# Patient Record
Sex: FEMALE | Race: OTHER | Employment: OTHER | URBAN - METROPOLITAN AREA
[De-identification: names, ages, dates, MRNs, and addresses within clinical notes are randomized per-mention and may not be internally consistent; named-entity substitution may affect disease eponyms.]

---

## 2017-01-07 RX ORDER — METOPROLOL SUCCINATE 50 MG/1
TABLET, EXTENDED RELEASE ORAL
Qty: 90 TABLET | Refills: 0 | Status: SHIPPED | OUTPATIENT
Start: 2017-01-07 | End: 2017-04-05

## 2017-04-05 RX ORDER — HYDROCHLOROTHIAZIDE 12.5 MG/1
CAPSULE, GELATIN COATED ORAL
Qty: 90 CAPSULE | Refills: 0 | Status: SHIPPED | OUTPATIENT
Start: 2017-04-05 | End: 2017-04-19

## 2017-04-05 RX ORDER — METOPROLOL SUCCINATE 50 MG/1
TABLET, EXTENDED RELEASE ORAL
Qty: 90 TABLET | Refills: 0 | Status: SHIPPED | OUTPATIENT
Start: 2017-04-05 | End: 2017-04-19

## 2017-04-19 ENCOUNTER — OFFICE VISIT (OUTPATIENT)
Dept: INTERNAL MEDICINE CLINIC | Facility: CLINIC | Age: 77
End: 2017-04-19

## 2017-04-19 VITALS
HEART RATE: 54 BPM | WEIGHT: 113 LBS | HEIGHT: 61 IN | DIASTOLIC BLOOD PRESSURE: 76 MMHG | BODY MASS INDEX: 21.34 KG/M2 | SYSTOLIC BLOOD PRESSURE: 134 MMHG

## 2017-04-19 DIAGNOSIS — M06.9 RHEUMATOID ARTHRITIS INVOLVING BOTH HANDS, UNSPECIFIED RHEUMATOID FACTOR PRESENCE: ICD-10-CM

## 2017-04-19 DIAGNOSIS — I10 HYPERTENSION, BENIGN: Primary | ICD-10-CM

## 2017-04-19 PROCEDURE — 99213 OFFICE O/P EST LOW 20 MIN: CPT | Performed by: INTERNAL MEDICINE

## 2017-04-19 PROCEDURE — 36415 COLL VENOUS BLD VENIPUNCTURE: CPT | Performed by: INTERNAL MEDICINE

## 2017-04-19 RX ORDER — HYDROCHLOROTHIAZIDE 12.5 MG/1
12.5 CAPSULE, GELATIN COATED ORAL
Qty: 90 CAPSULE | Refills: 1 | Status: SHIPPED | OUTPATIENT
Start: 2017-04-19 | End: 2018-01-01

## 2017-04-19 RX ORDER — METOPROLOL SUCCINATE 50 MG/1
TABLET, EXTENDED RELEASE ORAL
Qty: 90 TABLET | Refills: 1 | Status: SHIPPED | OUTPATIENT
Start: 2017-04-19 | End: 2018-01-01

## 2017-04-19 NOTE — PATIENT INSTRUCTIONS
ASSESSMENT/PLAN:   Diagnoses and all orders for this visit:    Hypertension, benign  BP overall doing well with current meds  Rheumatoid arthritis involving both hands, unspecified rheumatoid factor presence ([facility] [address]  Patient overall doing well, to continue c

## 2017-04-19 NOTE — PROGRESS NOTES
HPI:    Patient ID: Mis Shaw is a 78year old female. HPI  Hypertension  Patient is here for follow up of hypertension. BP at home: not checking, machine broke  Has been compliant with medications.   Exercise level: somewhat active and has been foll Constitutional: She appears well-developed and well-nourished. HENT:   Head: Normocephalic and atraumatic. Cardiovascular: Normal rate and regular rhythm. Edema not present.   Pulmonary/Chest: Effort normal and breath sounds normal.   Musculoskele

## 2017-11-20 ENCOUNTER — OFFICE VISIT (OUTPATIENT)
Dept: INTERNAL MEDICINE CLINIC | Facility: CLINIC | Age: 77
End: 2017-11-20

## 2017-11-20 VITALS
SYSTOLIC BLOOD PRESSURE: 136 MMHG | DIASTOLIC BLOOD PRESSURE: 74 MMHG | BODY MASS INDEX: 22.19 KG/M2 | HEART RATE: 64 BPM | HEIGHT: 60 IN | WEIGHT: 113 LBS

## 2017-11-20 DIAGNOSIS — M85.80 SENILE OSTEOPENIA: ICD-10-CM

## 2017-11-20 DIAGNOSIS — Z78.0 MENOPAUSE: ICD-10-CM

## 2017-11-20 DIAGNOSIS — I10 HYPERTENSION, BENIGN: Primary | ICD-10-CM

## 2017-11-20 DIAGNOSIS — M06.09 RHEUMATOID ARTHRITIS OF MULTIPLE SITES WITH NEGATIVE RHEUMATOID FACTOR (HCC): ICD-10-CM

## 2017-11-20 PROCEDURE — 36415 COLL VENOUS BLD VENIPUNCTURE: CPT | Performed by: INTERNAL MEDICINE

## 2017-11-20 PROCEDURE — G0008 ADMIN INFLUENZA VIRUS VAC: HCPCS | Performed by: INTERNAL MEDICINE

## 2017-11-20 PROCEDURE — 90653 IIV ADJUVANT VACCINE IM: CPT | Performed by: INTERNAL MEDICINE

## 2017-11-20 PROCEDURE — 99213 OFFICE O/P EST LOW 20 MIN: CPT | Performed by: INTERNAL MEDICINE

## 2017-11-20 NOTE — PATIENT INSTRUCTIONS
ASSESSMENT/PLAN:   Diagnoses and all orders for this visit:    Hypertension, benign  BP overall doing very well, continue current meds and regular exercise.     Rheumatoid arthritis of multiple sites with negative rheumatoid factor ([facility] [address]  Patient overall st

## 2017-11-20 NOTE — PROGRESS NOTES
HPI:    Patient ID: Branden Holm is a 78year old female. HPI  Hypertension  Patient is here for follow up of hypertension. BP at home: not checking  Has been compliant with medications.   Exercise level: somewhat active and has been following low salt well-nourished. HENT:   Head: Normocephalic and atraumatic. Cardiovascular: Normal rate and regular rhythm. Edema not present. Carotid bruit not present.   Pulmonary/Chest: Effort normal and breath sounds normal.   Musculoskeletal:   Severe RA change

## 2018-01-01 RX ORDER — METOPROLOL SUCCINATE 50 MG/1
TABLET, EXTENDED RELEASE ORAL
Qty: 90 TABLET | Refills: 1 | Status: SHIPPED | OUTPATIENT
Start: 2018-01-01 | End: 2018-07-06

## 2018-01-01 RX ORDER — HYDROCHLOROTHIAZIDE 12.5 MG/1
CAPSULE, GELATIN COATED ORAL
Qty: 90 CAPSULE | Refills: 1 | Status: SHIPPED | OUTPATIENT
Start: 2018-01-01 | End: 2018-07-22

## 2018-04-23 ENCOUNTER — OFFICE VISIT (OUTPATIENT)
Dept: INTERNAL MEDICINE CLINIC | Facility: CLINIC | Age: 78
End: 2018-04-23

## 2018-04-23 VITALS
WEIGHT: 113 LBS | BODY MASS INDEX: 22.19 KG/M2 | DIASTOLIC BLOOD PRESSURE: 69 MMHG | SYSTOLIC BLOOD PRESSURE: 126 MMHG | HEART RATE: 64 BPM | HEIGHT: 60 IN

## 2018-04-23 DIAGNOSIS — M54.50 LUMBAR PAIN: Primary | ICD-10-CM

## 2018-04-23 PROCEDURE — 99213 OFFICE O/P EST LOW 20 MIN: CPT | Performed by: INTERNAL MEDICINE

## 2018-04-23 PROCEDURE — G0463 HOSPITAL OUTPT CLINIC VISIT: HCPCS | Performed by: INTERNAL MEDICINE

## 2018-04-23 RX ORDER — TIZANIDINE 2 MG/1
2 TABLET ORAL EVERY 6 HOURS PRN
Qty: 30 TABLET | Refills: 0 | Status: SHIPPED | OUTPATIENT
Start: 2018-04-23 | End: 2018-05-21

## 2018-04-23 RX ORDER — PREDNISONE 20 MG/1
20 TABLET ORAL DAILY
Qty: 4 TABLET | Refills: 0 | Status: SHIPPED | OUTPATIENT
Start: 2018-04-23 | End: 2018-04-27

## 2018-04-23 NOTE — PATIENT INSTRUCTIONS
ASSESSMENT/PLAN:   Diagnoses and all orders for this visit:    Lumbar pain    patient with moderate pain which seems muscular with no radiating pain. Will give her short course of prednisone and should have x-ray of her lower back.  Will also give her muscl

## 2018-04-23 NOTE — PROGRESS NOTES
HPI:    Patient ID: Loy Gutierrez is a 78year old female. HPI  Back pain  Patient with lower back pain for the last 2 weeks. Doesn't recall any injury or particular event that caused it. Has tried heat which didn't really help.  Doesn't radiate to her l

## 2018-05-21 ENCOUNTER — OFFICE VISIT (OUTPATIENT)
Dept: INTERNAL MEDICINE CLINIC | Facility: CLINIC | Age: 78
End: 2018-05-21

## 2018-05-21 VITALS
BODY MASS INDEX: 21.52 KG/M2 | SYSTOLIC BLOOD PRESSURE: 132 MMHG | HEART RATE: 67 BPM | TEMPERATURE: 98 F | HEIGHT: 61 IN | WEIGHT: 114 LBS | DIASTOLIC BLOOD PRESSURE: 66 MMHG | RESPIRATION RATE: 18 BRPM

## 2018-05-21 DIAGNOSIS — M06.09 RHEUMATOID ARTHRITIS OF MULTIPLE SITES WITH NEGATIVE RHEUMATOID FACTOR (HCC): ICD-10-CM

## 2018-05-21 DIAGNOSIS — I10 HYPERTENSION, BENIGN: Primary | ICD-10-CM

## 2018-05-21 PROCEDURE — G0463 HOSPITAL OUTPT CLINIC VISIT: HCPCS | Performed by: INTERNAL MEDICINE

## 2018-05-21 PROCEDURE — 36415 COLL VENOUS BLD VENIPUNCTURE: CPT | Performed by: INTERNAL MEDICINE

## 2018-05-21 PROCEDURE — 99213 OFFICE O/P EST LOW 20 MIN: CPT | Performed by: INTERNAL MEDICINE

## 2018-05-21 NOTE — ASSESSMENT & PLAN NOTE
BP well controlled. Encouraged her to exercise regularly.  Should check BP  At times at home as it sometimes can run high

## 2018-05-21 NOTE — PROGRESS NOTES
HPI:    Patient ID: Kylah De La O is a 78year old female. HPI  Hypertension  Patient is here for follow up of hypertension. BP at home: not checking, her machine broke. Has been compliant with medications.   Exercise level: somewhat active and has been Vitals reviewed. Constitutional: She appears well-developed and well-nourished. Cardiovascular: Normal rate and regular rhythm. Edema not present.   Pulmonary/Chest: Effort normal and breath sounds normal.   Musculoskeletal:   Moderate RA changes

## 2018-05-21 NOTE — PATIENT INSTRUCTIONS
ASSESSMENT/PLAN:   Hypertension, benign  BP well controlled. Encouraged her to exercise regularly.  Should check BP  At times at home as it sometimes can run high    Rheumatoid arthritis of multiple sites with negative rheumatoid factor (HCC)  Overall stabl

## 2018-05-22 NOTE — PROGRESS NOTES
Labs normal except her potassium is low, needs to take potassium pill daily. Let her know  I sent her labs to Dr Bridger Ding.

## 2018-07-06 RX ORDER — METOPROLOL SUCCINATE 50 MG/1
TABLET, EXTENDED RELEASE ORAL
Qty: 90 TABLET | Refills: 0 | Status: SHIPPED | OUTPATIENT
Start: 2018-07-06 | End: 2018-10-04

## 2018-07-22 RX ORDER — HYDROCHLOROTHIAZIDE 12.5 MG/1
CAPSULE, GELATIN COATED ORAL
Qty: 90 CAPSULE | Refills: 0 | Status: SHIPPED | OUTPATIENT
Start: 2018-07-22 | End: 2018-10-19

## 2018-07-22 NOTE — TELEPHONE ENCOUNTER
Refill passed per [facility], [facility] protocol.   Hypertensive Medications  Protocol Criteria:  · Appointment scheduled in the past 6 months or in the next 3 months  · BMP or CMP in the past 12 months  · Creatinine result < 2  Recent Outpatient Visits

## 2018-10-04 RX ORDER — METOPROLOL SUCCINATE 50 MG/1
TABLET, EXTENDED RELEASE ORAL
Qty: 90 TABLET | Refills: 0 | Status: SHIPPED | OUTPATIENT
Start: 2018-10-04 | End: 2019-04-01

## 2018-10-19 RX ORDER — HYDROCHLOROTHIAZIDE 12.5 MG/1
CAPSULE, GELATIN COATED ORAL
Qty: 90 CAPSULE | Refills: 0 | Status: SHIPPED | OUTPATIENT
Start: 2018-10-19 | End: 2019-01-07

## 2018-11-14 ENCOUNTER — OFFICE VISIT (OUTPATIENT)
Dept: INTERNAL MEDICINE CLINIC | Facility: CLINIC | Age: 78
End: 2018-11-14
Payer: MEDICARE

## 2018-11-14 VITALS
WEIGHT: 114 LBS | RESPIRATION RATE: 18 BRPM | BODY MASS INDEX: 21.52 KG/M2 | HEART RATE: 74 BPM | DIASTOLIC BLOOD PRESSURE: 85 MMHG | TEMPERATURE: 99 F | SYSTOLIC BLOOD PRESSURE: 138 MMHG | HEIGHT: 61 IN

## 2018-11-14 DIAGNOSIS — M05.742 RHEUMATOID ARTHRITIS INVOLVING BOTH HANDS WITH POSITIVE RHEUMATOID FACTOR (HCC): Primary | ICD-10-CM

## 2018-11-14 DIAGNOSIS — M05.741 RHEUMATOID ARTHRITIS INVOLVING BOTH HANDS WITH POSITIVE RHEUMATOID FACTOR (HCC): Primary | ICD-10-CM

## 2018-11-14 DIAGNOSIS — I10 HTN (HYPERTENSION), BENIGN: ICD-10-CM

## 2018-11-14 PROCEDURE — 99213 OFFICE O/P EST LOW 20 MIN: CPT | Performed by: INTERNAL MEDICINE

## 2018-11-14 PROCEDURE — 90653 IIV ADJUVANT VACCINE IM: CPT | Performed by: INTERNAL MEDICINE

## 2018-11-14 PROCEDURE — G0008 ADMIN INFLUENZA VIRUS VAC: HCPCS | Performed by: INTERNAL MEDICINE

## 2018-11-14 RX ORDER — POTASSIUM CHLORIDE 20 MEQ/1
TABLET, EXTENDED RELEASE ORAL
COMMUNITY
Start: 2018-11-02 | End: 2019-07-19

## 2018-11-14 NOTE — PATIENT INSTRUCTIONS
Rheumatoid arthritis involving both hands with positive rheumatoid factor (hcc)  (primary encounter diagnosis) currently stable ,he is on methotrexate we will check her CBC and CMP today and fax results to Dr. Juan Daniel Pierre (hypertension), benign--advised

## 2018-11-14 NOTE — PROGRESS NOTES
HPI:    Patient ID: Monse Riojas is a 70year old female. HPI  She is here today for follow up on htn    She is not checking her blood pressure at home  But she is taking her medication  Regularly .   BP Readings from Last 3 Encounters:  11/14/18 : 153/ Rfl: 0   METOPROLOL SUCCINATE ER 50 MG Oral Tablet 24 Hr TAKE ONE TABLET BY MOUTH ONE TIME DAILY  Disp: 90 tablet Rfl: 0   folic acid (FOLVITE) 1 MG Oral Tab Take  by mouth. Disp:  Rfl:    Ibuprofen (ADVIL) 200 MG Oral Cap Take  by mouth.  Disp:  Rfl:    me oropharyngeal exudate, posterior oropharyngeal edema or posterior oropharyngeal erythema. Eyes: Conjunctivae and EOM are normal. Pupils are equal, round, and reactive to light. Right eye exhibits no discharge. Left eye exhibits no discharge.  No scleral i visit advised her to watch her diet avoid salty food and continue with current medication    Orders Placed This Encounter      CBC W Differential W Platelet [E]      Comp Metabolic Panel (14)      Meds This Visit:  Requested Prescriptions      No prescript

## 2018-11-16 ENCOUNTER — TELEPHONE (OUTPATIENT)
Dept: INTERNAL MEDICINE CLINIC | Facility: CLINIC | Age: 78
End: 2018-11-16

## 2019-01-07 RX ORDER — HYDROCHLOROTHIAZIDE 12.5 MG/1
CAPSULE, GELATIN COATED ORAL
Qty: 90 CAPSULE | Refills: 0 | Status: SHIPPED | OUTPATIENT
Start: 2019-01-07 | End: 2019-04-05

## 2019-03-04 ENCOUNTER — OFFICE VISIT (OUTPATIENT)
Dept: INTERNAL MEDICINE CLINIC | Facility: CLINIC | Age: 79
End: 2019-03-04
Payer: MEDICARE

## 2019-03-04 VITALS
WEIGHT: 116 LBS | HEART RATE: 73 BPM | TEMPERATURE: 98 F | DIASTOLIC BLOOD PRESSURE: 80 MMHG | SYSTOLIC BLOOD PRESSURE: 138 MMHG | RESPIRATION RATE: 18 BRPM | BODY MASS INDEX: 21.9 KG/M2 | HEIGHT: 61 IN

## 2019-03-04 DIAGNOSIS — R21 RASH: Primary | ICD-10-CM

## 2019-03-04 PROCEDURE — 99213 OFFICE O/P EST LOW 20 MIN: CPT | Performed by: INTERNAL MEDICINE

## 2019-03-04 RX ORDER — VALACYCLOVIR HYDROCHLORIDE 1 G/1
1 TABLET, FILM COATED ORAL 3 TIMES DAILY
Qty: 21 TABLET | Refills: 0 | Status: SHIPPED | OUTPATIENT
Start: 2019-03-04 | End: 2019-03-11

## 2019-03-04 RX ORDER — VALACYCLOVIR HYDROCHLORIDE 1 G/1
1 TABLET, FILM COATED ORAL 3 TIMES DAILY
Qty: 21 TABLET | Refills: 0 | Status: SHIPPED | OUTPATIENT
Start: 2019-03-04 | End: 2019-03-04

## 2019-03-04 NOTE — PATIENT INSTRUCTIONS
Rash - shingles - start valacyclovir every 8 hr for 7 days , wash hands properly  , do not scratch, if is getting worse call us

## 2019-03-04 NOTE — PROGRESS NOTES
HPI:    Patient ID: Linda Huynh is a 70year old female. HPI   Rash on her  Abdomen. According to her starrted one week ago on the right side of the belly button , with some discomfort and pain - minimal and then spread toward the right.  She describes HYDROCHLOROTHIAZIDE 12.5 MG Oral Cap TAKE 1 CAPSULE BY MOUTH EVERY DAY Disp: 90 capsule Rfl: 0   METOPROLOL SUCCINATE ER 50 MG Oral Tablet 24 Hr TAKE ONE TABLET BY MOUTH ONE TIME DAILY  Disp: 90 tablet Rfl: 0   folic acid (FOLVITE) 1 MG Oral Tab Take  by m Mouth/Throat: Uvula is midline, oropharynx is clear and moist and mucous membranes are normal. Mucous membranes are not cyanotic. No oropharyngeal exudate, posterior oropharyngeal edema or posterior oropharyngeal erythema.    Eyes: Conjunctivae and EOM are No orders of the defined types were placed in this encounter.       Meds This Visit:  Requested Prescriptions     Signed Prescriptions Disp Refills   • valACYclovir HCl 1 G Oral Tab 21 tablet 0     Sig: Take 1 tablet (1,000 mg total) by mouth 3 (three) time

## 2019-03-11 RX ORDER — FOLIC ACID 1 MG/1
1 TABLET ORAL DAILY
Qty: 30 TABLET | Refills: 0 | Status: SHIPPED | OUTPATIENT
Start: 2019-03-11 | End: 2019-04-05

## 2019-03-11 NOTE — TELEPHONE ENCOUNTER
She is asking for the rx to be refilled  This are given before by Dr. Abigail Smart and you told her you can refill here   methotrexate (RHEUMATREX) 2.5 MG Oral Tab  folic acid (FOLVITE) 1 MG Oral Tab

## 2019-03-12 ENCOUNTER — TELEPHONE (OUTPATIENT)
Dept: INTERNAL MEDICINE CLINIC | Facility: CLINIC | Age: 79
End: 2019-03-12

## 2019-03-12 NOTE — TELEPHONE ENCOUNTER
Pharmacy calling to clarify methotrexate directions. Directions state 12.5 mg three times a week. And 12.5 mg weekly. She took it weekly before. Please clarify and can you please change directions in chart for next refill. Thank you.

## 2019-03-12 NOTE — TELEPHONE ENCOUNTER
Once a week , but will need records from her rheumathologist in order for us to continue to refill.  I send this time

## 2019-03-12 NOTE — TELEPHONE ENCOUNTER
Spoke with patient. She understands. I provided name and phone number for Dr. Paco William. Her previous rheumatologist is out of [facility] and she has not been to see her for a long time because Dr. Jelani Carmona used to manage.  But patient will make an appointment th

## 2019-04-01 RX ORDER — METOPROLOL SUCCINATE 50 MG/1
TABLET, EXTENDED RELEASE ORAL
Qty: 90 TABLET | Refills: 0 | Status: SHIPPED | OUTPATIENT
Start: 2019-04-01 | End: 2019-07-05

## 2019-04-05 RX ORDER — FOLIC ACID 1 MG/1
1 TABLET ORAL DAILY
Qty: 30 TABLET | Refills: 0 | OUTPATIENT
Start: 2019-04-05

## 2019-04-05 RX ORDER — HYDROCHLOROTHIAZIDE 12.5 MG/1
12.5 CAPSULE, GELATIN COATED ORAL
Qty: 90 CAPSULE | Refills: 0 | OUTPATIENT
Start: 2019-04-05

## 2019-04-05 RX ORDER — FOLIC ACID 1 MG/1
1 TABLET ORAL DAILY
Qty: 30 TABLET | Refills: 0 | Status: CANCELLED | OUTPATIENT
Start: 2019-04-05

## 2019-04-05 NOTE — TELEPHONE ENCOUNTER
Refill Protocol Appointment Criteria  · Appointment scheduled in the past 12 months or in the next 3 months  Recent Outpatient Visits            1 month ago Steven Christie MD    Office Visit    4 months ago R

## 2019-05-06 RX ORDER — FOLIC ACID 1 MG/1
TABLET ORAL
Qty: 90 TABLET | Refills: 1 | Status: SHIPPED | OUTPATIENT
Start: 2019-05-06 | End: 2019-07-19

## 2019-05-15 ENCOUNTER — OFFICE VISIT (OUTPATIENT)
Dept: INTERNAL MEDICINE CLINIC | Facility: CLINIC | Age: 79
End: 2019-05-15
Payer: MEDICARE

## 2019-05-15 VITALS
DIASTOLIC BLOOD PRESSURE: 76 MMHG | RESPIRATION RATE: 18 BRPM | HEIGHT: 61 IN | TEMPERATURE: 99 F | WEIGHT: 113 LBS | HEART RATE: 78 BPM | BODY MASS INDEX: 21.34 KG/M2 | SYSTOLIC BLOOD PRESSURE: 137 MMHG

## 2019-05-15 DIAGNOSIS — I10 ESSENTIAL HYPERTENSION: Primary | ICD-10-CM

## 2019-05-15 DIAGNOSIS — D46.4 RA (REFRACTORY ANEMIA) (HCC): ICD-10-CM

## 2019-05-15 DIAGNOSIS — M06.9 RHEUMATOID ARTHRITIS INVOLVING ANKLE, UNSPECIFIED LATERALITY, UNSPECIFIED RHEUMATOID FACTOR PRESENCE: ICD-10-CM

## 2019-05-15 PROBLEM — B02.29 POSTHERPETIC NEURALGIA: Status: ACTIVE | Noted: 2019-05-15

## 2019-05-15 PROCEDURE — 99214 OFFICE O/P EST MOD 30 MIN: CPT | Performed by: INTERNAL MEDICINE

## 2019-05-15 NOTE — PROGRESS NOTES
HPI:    Patient ID: Steve Draper is a 70year old female. HPI   htn     she is here today for follow up on her blood pressure . According to her she is not checking her blood pressure at home but she is watching her diet , not  eating salty food.  She i Disp: 90 tablet Rfl: 1   hydrochlorothiazide 12.5 MG Oral Cap Take 1 capsule (12.5 mg total) by mouth once daily.  Disp: 90 capsule Rfl: 0   METOPROLOL SUCCINATE ER 50 MG Oral Tablet 24 Hr TAKE 1 TABLET BY MOUTH EVERY DAY Disp: 90 tablet Rfl: 0   Potassium posterior oropharyngeal edema or posterior oropharyngeal erythema. Eyes: Pupils are equal, round, and reactive to light. Conjunctivae and EOM are normal. Right eye exhibits no discharge. Left eye exhibits no discharge. No scleral icterus.    Neck: Normal it gets worse she will call us      No orders of the defined types were placed in this encounter.       Meds This Visit:  Requested Prescriptions      No prescriptions requested or ordered in this encounter       Imaging & Referrals:  None        MT#[id number]

## 2019-05-15 NOTE — PATIENT INSTRUCTIONS
htn- controlled, advised to continue with current medication check blood pressure at home and bring logbook next visit advised to watch diet avoid salty food.     Rheumatoid arthritis -on methotrexate will check CBC she has referral for rheumatology  Ryan

## 2019-07-05 RX ORDER — METOPROLOL SUCCINATE 50 MG/1
TABLET, EXTENDED RELEASE ORAL
Qty: 90 TABLET | Refills: 1 | Status: SHIPPED | OUTPATIENT
Start: 2019-07-05 | End: 2020-01-02

## 2019-07-05 NOTE — TELEPHONE ENCOUNTER
Refill passed per [facility] protocol.   Hypertensive Medications  Protocol Criteria:  · Appointment scheduled in the past 6 months or in the next 3 months  · BMP or CMP in the past 12 months  · Creatinine result < 2  Recent Outpatient Visits

## 2019-07-15 RX ORDER — HYDROCHLOROTHIAZIDE 12.5 MG/1
CAPSULE, GELATIN COATED ORAL
Qty: 90 CAPSULE | Refills: 1 | Status: SHIPPED | OUTPATIENT
Start: 2019-07-15 | End: 2020-01-01

## 2019-07-15 NOTE — TELEPHONE ENCOUNTER
Refill passed per [facility] protocol. \  Hypertensive Medications  Protocol Criteria:  · Appointment scheduled in the past 6 months or in the next 3 months  · BMP or CMP in the past 12 months  · Creatinine result < 2  Recent Outpatient Visits

## 2019-07-19 ENCOUNTER — APPOINTMENT (OUTPATIENT)
Dept: LAB | Facility: HOSPITAL | Age: 79
End: 2019-07-19
Attending: INTERNAL MEDICINE
Payer: MEDICARE

## 2019-07-19 ENCOUNTER — OFFICE VISIT (OUTPATIENT)
Dept: RHEUMATOLOGY | Facility: CLINIC | Age: 79
End: 2019-07-19
Payer: MEDICARE

## 2019-07-19 VITALS
WEIGHT: 111 LBS | HEART RATE: 76 BPM | BODY MASS INDEX: 20.96 KG/M2 | DIASTOLIC BLOOD PRESSURE: 83 MMHG | HEIGHT: 61 IN | SYSTOLIC BLOOD PRESSURE: 144 MMHG

## 2019-07-19 DIAGNOSIS — Z51.81 MEDICATION MONITORING ENCOUNTER: ICD-10-CM

## 2019-07-19 DIAGNOSIS — M06.9 RHEUMATOID ARTHRITIS, INVOLVING UNSPECIFIED SITE, UNSPECIFIED RHEUMATOID FACTOR PRESENCE: ICD-10-CM

## 2019-07-19 DIAGNOSIS — M06.9 RHEUMATOID ARTHRITIS, INVOLVING UNSPECIFIED SITE, UNSPECIFIED RHEUMATOID FACTOR PRESENCE: Primary | ICD-10-CM

## 2019-07-19 LAB
CRP SERPL-MCNC: <0.29 MG/DL (ref ?–0.3)
ERYTHROCYTE [SEDIMENTATION RATE] IN BLOOD: 31 MM/HR (ref 0–30)
HBV CORE AB SERPL QL IA: NONREACTIVE
HBV SURFACE AB SER QL: NONREACTIVE
HBV SURFACE AB SERPL IA-ACNC: <3.1 MIU/ML
HBV SURFACE AG SER-ACNC: <0.1 [IU]/L
HBV SURFACE AG SERPL QL IA: NONREACTIVE
HCV AB SERPL QL IA: NONREACTIVE
RHEUMATOID FACT SERPL-ACNC: 305 IU/ML (ref ?–15)

## 2019-07-19 PROCEDURE — 86706 HEP B SURFACE ANTIBODY: CPT | Performed by: INTERNAL MEDICINE

## 2019-07-19 PROCEDURE — 86200 CCP ANTIBODY: CPT | Performed by: INTERNAL MEDICINE

## 2019-07-19 PROCEDURE — 86140 C-REACTIVE PROTEIN: CPT | Performed by: INTERNAL MEDICINE

## 2019-07-19 PROCEDURE — 86480 TB TEST CELL IMMUN MEASURE: CPT

## 2019-07-19 PROCEDURE — 86704 HEP B CORE ANTIBODY TOTAL: CPT | Performed by: INTERNAL MEDICINE

## 2019-07-19 PROCEDURE — 86803 HEPATITIS C AB TEST: CPT | Performed by: INTERNAL MEDICINE

## 2019-07-19 PROCEDURE — 87340 HEPATITIS B SURFACE AG IA: CPT | Performed by: INTERNAL MEDICINE

## 2019-07-19 PROCEDURE — 36415 COLL VENOUS BLD VENIPUNCTURE: CPT

## 2019-07-19 PROCEDURE — 86431 RHEUMATOID FACTOR QUANT: CPT | Performed by: INTERNAL MEDICINE

## 2019-07-19 PROCEDURE — G0463 HOSPITAL OUTPT CLINIC VISIT: HCPCS | Performed by: INTERNAL MEDICINE

## 2019-07-19 PROCEDURE — 99204 OFFICE O/P NEW MOD 45 MIN: CPT | Performed by: INTERNAL MEDICINE

## 2019-07-19 PROCEDURE — 85652 RBC SED RATE AUTOMATED: CPT | Performed by: INTERNAL MEDICINE

## 2019-07-19 RX ORDER — FOLIC ACID 1 MG/1
1 TABLET ORAL DAILY
Qty: 90 TABLET | Refills: 1 | Status: SHIPPED | OUTPATIENT
Start: 2019-07-19 | End: 2020-01-02

## 2019-07-19 NOTE — PATIENT INSTRUCTIONS
You were seen today for RA  Plan to get blood work today  Refilled your methotrexate and FA  Follow up october

## 2019-07-19 NOTE — PROGRESS NOTES
Dave Valerio is a 70year old female who presents for Patient presents with:  Rheumatoid Arthritis: New Pt, referred by Dr. Lisa Bautista for RA  .   HPI:     I had the pleasure of seeing Dave Valerio on 7/19/2019 for evaluation of RA  Patient was referred by tara w/bladder sling      Family History   Problem Relation Age of Onset   • Hypertension Father    • Diabetes Father 36   • Heart Disease Father 36   • Diabetes Sister 62        Cause of death   • Lung Disorder Brother         COPD      Social History:  Social pain or swelling or warmth on palpation  Shoulders: FROM, no pain or swelling or warmth on palpation  Hip: normal log roll, no lateral hip pain, MAGGIE test negative b/l  Knees: FROM, no warmth or effusion present. No pain with ROM.    Ankles: FROM, no pain OSMOLALITY      275 - 295 mOsm/kg 286   GFR, Non-      >=60 66   GFR, -American      >=60 76   ALT (SGPT)      13 - 56 U/L 23   AST (SGOT)      15 - 37 U/L 29   ALKALINE PHOSPHATASE      55 - 142 U/L 67   Total Bilirubin      0.1 - 2

## 2019-07-22 LAB
M TB IFN-G CD4+ T-CELLS BLD-ACNC: 0.03 IU/ML
M TB TUBERC IFN-G BLD QL: NEGATIVE
M TB TUBERC IGNF/MITOGEN IGNF CONTROL: 3.47 IU/ML
QUANTIFERON TB1 MINUS NIL: 0.01 IU/ML
QUANTIFERON TB2 MINUS NIL: 0 IU/ML

## 2019-07-24 LAB — CCP IGG SERPL-ACNC: >340 U/ML (ref 0–6.9)

## 2019-07-25 ENCOUNTER — TELEPHONE (OUTPATIENT)
Dept: RHEUMATOLOGY | Facility: CLINIC | Age: 79
End: 2019-07-25

## 2020-01-01 RX ORDER — HYDROCHLOROTHIAZIDE 12.5 MG/1
CAPSULE, GELATIN COATED ORAL
Qty: 90 CAPSULE | Refills: 0 | Status: SHIPPED | OUTPATIENT
Start: 2020-01-01 | End: 2020-03-31

## 2020-01-01 NOTE — TELEPHONE ENCOUNTER
Hypertensive Medications. REFILLED PER PROTOCOL. CSS, please call pt and schedule follow up OV. Thanks.     Protocol Criteria:  · Appointment scheduled in the past 6 months or in the next 3 months  · BMP or CMP in the past 12 months  · Creatinine result

## 2020-01-02 RX ORDER — METOPROLOL SUCCINATE 50 MG/1
TABLET, EXTENDED RELEASE ORAL
Qty: 90 TABLET | Refills: 0 | Status: SHIPPED | OUTPATIENT
Start: 2020-01-02 | End: 2020-03-31

## 2020-01-02 RX ORDER — FOLIC ACID 1 MG/1
1 TABLET ORAL DAILY
Qty: 90 TABLET | Refills: 1 | Status: SHIPPED | OUTPATIENT
Start: 2020-01-02 | End: 2020-07-15

## 2020-01-02 NOTE — TELEPHONE ENCOUNTER
Last filled: 7-19-19 #90 tab with 1 refill  LOV: 7-19-19  No future appointments.   Labs:   Component      Latest Ref Rng & Units 7/19/2019   Quantiferon TB NIL      IU/mL 0.03   Quantiferon-TB1 Minus NIL      IU/mL 0.01   Quantiferon-TB2 Minus NIL      IU/

## 2020-01-02 NOTE — TELEPHONE ENCOUNTER
Current Outpatient Medications   Medication Sig Dispense Refill   • folic acid 1 MG Oral Tab Take 1 tablet (1 mg total) by mouth daily.  90 tablet 1

## 2020-01-24 NOTE — TELEPHONE ENCOUNTER
Requested Prescriptions     Pending Prescriptions Disp Refills   • methotrexate 2.5 MG Oral Tab 60 tablet 1     Sig: Take 5 tablets (12.5 mg total) by mouth once a week.      Last filled: 7/19/19 #60 tab w/ 1 rf  LOV: 7/19/19  Future Appointments   Date Ralf Kimble

## 2020-02-17 ENCOUNTER — OFFICE VISIT (OUTPATIENT)
Dept: INTERNAL MEDICINE CLINIC | Facility: CLINIC | Age: 80
End: 2020-02-17
Payer: MEDICARE

## 2020-02-17 VITALS
HEIGHT: 61 IN | DIASTOLIC BLOOD PRESSURE: 80 MMHG | BODY MASS INDEX: 21.52 KG/M2 | WEIGHT: 114 LBS | HEART RATE: 74 BPM | RESPIRATION RATE: 18 BRPM | SYSTOLIC BLOOD PRESSURE: 135 MMHG | TEMPERATURE: 98 F

## 2020-02-17 DIAGNOSIS — M85.80 OSTEOPENIA, UNSPECIFIED LOCATION: ICD-10-CM

## 2020-02-17 DIAGNOSIS — Z78.0 MENOPAUSE: ICD-10-CM

## 2020-02-17 DIAGNOSIS — M06.9 RHEUMATOID ARTHRITIS, INVOLVING UNSPECIFIED SITE, UNSPECIFIED RHEUMATOID FACTOR PRESENCE: ICD-10-CM

## 2020-02-17 DIAGNOSIS — D46.4 RA (REFRACTORY ANEMIA) (HCC): ICD-10-CM

## 2020-02-17 DIAGNOSIS — Z00.00 ENCOUNTER FOR ANNUAL HEALTH EXAMINATION: ICD-10-CM

## 2020-02-17 DIAGNOSIS — Z00.00 ANNUAL PHYSICAL EXAM: Primary | ICD-10-CM

## 2020-02-17 PROBLEM — B02.29 POSTHERPETIC NEURALGIA: Status: RESOLVED | Noted: 2019-05-15 | Resolved: 2020-02-17

## 2020-02-17 PROCEDURE — 90662 IIV NO PRSV INCREASED AG IM: CPT | Performed by: INTERNAL MEDICINE

## 2020-02-17 PROCEDURE — G0008 ADMIN INFLUENZA VIRUS VAC: HCPCS | Performed by: INTERNAL MEDICINE

## 2020-02-17 PROCEDURE — G0439 PPPS, SUBSEQ VISIT: HCPCS | Performed by: INTERNAL MEDICINE

## 2020-02-17 NOTE — PATIENT INSTRUCTIONS
Ricardo Grissom's SCREENING SCHEDULE   Tests on this list are recommended by your physician but may not be covered, or covered at this frequency, by your insurer. Please check with your insurance carrier before scheduling to verify coverage.    PREVENTATIVE abnormal There are no preventive care reminders to display for this patient. Update Health Maintenance if applicable    Flex Sigmoidoscopy Screen  Covered every 5 years No results found for this or any previous visit. No flowsheet data found.      Fecal Occ 10/21/14   • FLU VAC NO PRSV 4 ANAMIKA 3 YRS+    Please get every year    Pneumococcal 13 (Prevnar)  Covered Once after 65 Orders placed or performed in visit on 03/30/16   • PNEUMOCOCCAL VACC, 13 ANAMIKA IM    Please get once after your 65th birthday    Fe Edward

## 2020-02-17 NOTE — PROGRESS NOTES
HPI:   Ramonita St is a 70year old female who presents for a Medicare Subsequent Annual Wellness visit (Pt already had Initial Annual Wellness).       Her last annual assessment has been over 1 year: Annual Physical due on 08/12/1943         Fall/Risk As Encounters:  02/17/20 : 114 lb (51.7 kg)  07/19/19 : 111 lb (50.3 kg)  05/15/19 : 113 lb (51.3 kg)     Last Cholesterol Labs:   Lab Results   Component Value Date    CHOLEST 138 11/20/2017    HDL 54 11/20/2017    LDL 45 11/20/2017    TRIG 193 (H) 11/20/201 breath with exertion  CARDIOVASCULAR: denies chest pain on exertion  GI: denies abdominal pain, denies heartburn  : denies dysuria, vaginal discharge or itching, no complaint of urinary incontinence   MUSCULOSKELETAL: denies back pain  NEURO: denies head hearing loss: No                Visual Acuity                           Physical Exam   Nursing note and vitals reviewed. Constitutional: She is oriented to person, place, and time. She appears well-developed and well-nourished.    HENT:   Head: Normocep Dose 65 yr and older (38105) 11/20/2017, 11/14/2018   • FLUZONE 3 Yrs+ Quad Prsv Free 0.5 ml (75175) 10/21/2014   • FLUZONE Quad Multidose 6-35 Mos (98478) 02/03/2012   • HIGH DOSE FLU 65 YRS AND OLDER PRSV FREE SINGLE D (84580) FLU CLINIC 10/19/2016   • I Cardiovascular Disease Screening     LDL Annually LDL Cholesterol (mg/dL)   Date Value   11/20/2017 45        EKG - w/ Initial Preventative Physical Exam only, or if medically necessary Electrocardiogram date     Colorectal Cancer Screening      Colonosc same house as a HepB virus carrier   Homosexual men   Illicit injectable drug abusers     Tetanus Toxoid  Only covered with a cut with metal- TD and TDaP Not covered by Medicare Part B) No vaccine history found This may be covered with your prescription be

## 2020-03-31 RX ORDER — METOPROLOL SUCCINATE 50 MG/1
TABLET, EXTENDED RELEASE ORAL
Qty: 90 TABLET | Refills: 0 | Status: SHIPPED | OUTPATIENT
Start: 2020-03-31 | End: 2020-06-29

## 2020-03-31 RX ORDER — HYDROCHLOROTHIAZIDE 12.5 MG/1
CAPSULE, GELATIN COATED ORAL
Qty: 90 CAPSULE | Refills: 0 | Status: SHIPPED | OUTPATIENT
Start: 2020-03-31 | End: 2020-06-29

## 2020-06-29 RX ORDER — HYDROCHLOROTHIAZIDE 12.5 MG/1
CAPSULE, GELATIN COATED ORAL
Qty: 90 CAPSULE | Refills: 0 | Status: SHIPPED | OUTPATIENT
Start: 2020-06-29 | End: 2020-08-24

## 2020-06-29 RX ORDER — METOPROLOL SUCCINATE 50 MG/1
TABLET, EXTENDED RELEASE ORAL
Qty: 90 TABLET | Refills: 0 | Status: SHIPPED | OUTPATIENT
Start: 2020-06-29 | End: 2020-08-24

## 2020-06-29 RX ORDER — FOLIC ACID 1 MG/1
1 TABLET ORAL DAILY
Qty: 90 TABLET | Refills: 1 | OUTPATIENT
Start: 2020-06-29

## 2020-07-14 NOTE — TELEPHONE ENCOUNTER
LOV: 7/19/2019    Future Appointments   Date Time Provider Nathaly Mini   8/19/2020 11:40 AM Dinh Stevens MD [facility]   8/24/2020  1:00 PM Vielka Flores MD [id number] [address]     Labs: No reach labs completed. Please advise.

## 2020-07-14 NOTE — TELEPHONE ENCOUNTER
Patient needs a refill on    methotrexate 2.5 MG Oral Tab           Summary: Take 5 tablets (12.5 mg total) by mouth once a week. ,         folic acid 1 MG Oral Tab           Summary: Take 1 tablet (1 mg total) by mouth daily. ,             She is scheduled

## 2020-07-15 RX ORDER — FOLIC ACID 1 MG/1
1 TABLET ORAL DAILY
Qty: 90 TABLET | Refills: 1 | Status: SHIPPED | OUTPATIENT
Start: 2020-07-15 | End: 2020-08-24

## 2020-08-24 ENCOUNTER — OFFICE VISIT (OUTPATIENT)
Dept: INTERNAL MEDICINE CLINIC | Facility: CLINIC | Age: 80
End: 2020-08-24
Payer: MEDICARE

## 2020-08-24 VITALS
OXYGEN SATURATION: 98 % | BODY MASS INDEX: 22.86 KG/M2 | HEIGHT: 59 IN | SYSTOLIC BLOOD PRESSURE: 132 MMHG | DIASTOLIC BLOOD PRESSURE: 72 MMHG | HEART RATE: 76 BPM | TEMPERATURE: 98 F | WEIGHT: 113.38 LBS

## 2020-08-24 DIAGNOSIS — I10 ESSENTIAL HYPERTENSION: Primary | ICD-10-CM

## 2020-08-24 PROCEDURE — 99213 OFFICE O/P EST LOW 20 MIN: CPT | Performed by: INTERNAL MEDICINE

## 2020-08-24 RX ORDER — FOLIC ACID 1 MG/1
1 TABLET ORAL DAILY
Qty: 90 TABLET | Refills: 1 | Status: SHIPPED | OUTPATIENT
Start: 2020-08-24 | End: 2021-03-01

## 2020-08-24 RX ORDER — HYDROCHLOROTHIAZIDE 12.5 MG/1
12.5 CAPSULE, GELATIN COATED ORAL DAILY
Qty: 90 CAPSULE | Refills: 1 | Status: SHIPPED | OUTPATIENT
Start: 2020-08-24 | End: 2021-03-27

## 2020-08-24 RX ORDER — METOPROLOL SUCCINATE 50 MG/1
50 TABLET, EXTENDED RELEASE ORAL DAILY
Qty: 90 TABLET | Refills: 1 | Status: SHIPPED | OUTPATIENT
Start: 2020-08-24 | End: 2021-03-27

## 2020-08-24 NOTE — PROGRESS NOTES
HPI:    Patient ID: Dave Valerio is a 68year old female. HPI    She is here today for follow up . She is doing ok, she is not checking her blood pressure at home bu she is taking her medication regularly .  She needs refill on  Her medication  BP Readi hydrochlorothiazide 12.5 MG Oral Cap Take 1 capsule (12.5 mg total) by mouth daily. 90 capsule 1   • Metoprolol Succinate ER 50 MG Oral Tablet 24 Hr Take 1 tablet (50 mg total) by mouth daily.  90 tablet 1   • folic acid 1 MG Oral Tab Take 1 tablet (1 mg to membranes are normal. Mucous membranes are not cyanotic. No oropharyngeal exudate, posterior oropharyngeal edema or posterior oropharyngeal erythema. Eyes: Pupils are equal, round, and reactive to light.  Conjunctivae and EOM are normal. Right eye exhibit Signed Prescriptions Disp Refills   • hydrochlorothiazide 12.5 MG Oral Cap 90 capsule 1     Sig: Take 1 capsule (12.5 mg total) by mouth daily.    • Metoprolol Succinate ER 50 MG Oral Tablet 24 Hr 90 tablet 1     Sig: Take 1 tablet (50 mg total) by mout

## 2020-08-24 NOTE — PATIENT INSTRUCTIONS
Essential hypertension  (primary encounter diagnosis) well controlled , advised her to continue with same medication, check blood pressure at home and bring log book next visit , watch diet, avoid salty food

## 2020-10-05 ENCOUNTER — TELEPHONE (OUTPATIENT)
Dept: INTERNAL MEDICINE CLINIC | Facility: CLINIC | Age: 80
End: 2020-10-05

## 2020-10-05 ENCOUNTER — HOSPITAL ENCOUNTER (EMERGENCY)
Facility: HOSPITAL | Age: 80
Discharge: HOME | End: 2020-10-05
Attending: EMERGENCY MEDICINE
Payer: MEDICARE

## 2020-10-05 ENCOUNTER — HOSPITAL ENCOUNTER (OUTPATIENT)
Age: 80
Discharge: ANOTHER HOSPITAL | End: 2020-10-05
Attending: EMERGENCY MEDICINE
Payer: MEDICARE

## 2020-10-05 ENCOUNTER — APPOINTMENT (OUTPATIENT)
Dept: GENERAL RADIOLOGY | Facility: HOSPITAL | Age: 80
End: 2020-10-05
Attending: EMERGENCY MEDICINE
Payer: MEDICARE

## 2020-10-05 VITALS
SYSTOLIC BLOOD PRESSURE: 176 MMHG | HEART RATE: 85 BPM | WEIGHT: 113 LBS | HEIGHT: 61 IN | BODY MASS INDEX: 21.34 KG/M2 | DIASTOLIC BLOOD PRESSURE: 82 MMHG | RESPIRATION RATE: 12 BRPM | OXYGEN SATURATION: 99 % | TEMPERATURE: 99 F

## 2020-10-05 VITALS
TEMPERATURE: 98 F | OXYGEN SATURATION: 98 % | WEIGHT: 114 LBS | HEIGHT: 61 IN | HEART RATE: 78 BPM | DIASTOLIC BLOOD PRESSURE: 95 MMHG | RESPIRATION RATE: 20 BRPM | SYSTOLIC BLOOD PRESSURE: 172 MMHG | BODY MASS INDEX: 21.52 KG/M2
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
## 2020-10-05 DIAGNOSIS — R07.9 CHEST PAIN OF UNCERTAIN ETIOLOGY: Primary | ICD-10-CM

## 2020-10-05 DIAGNOSIS — S20.211A CONTUSION OF RIGHT CHEST WALL, INITIAL ENCOUNTER: Primary | ICD-10-CM

## 2020-10-05 PROCEDURE — 99204 OFFICE O/P NEW MOD 45 MIN: CPT | Performed by: EMERGENCY MEDICINE

## 2020-10-05 PROCEDURE — 85025 COMPLETE CBC W/AUTO DIFF WBC: CPT

## 2020-10-05 PROCEDURE — 71045 X-RAY EXAM CHEST 1 VIEW: CPT | Performed by: EMERGENCY MEDICINE

## 2020-10-05 PROCEDURE — 84484 ASSAY OF TROPONIN QUANT: CPT | Performed by: EMERGENCY MEDICINE

## 2020-10-05 PROCEDURE — 84484 ASSAY OF TROPONIN QUANT: CPT

## 2020-10-05 PROCEDURE — 80048 BASIC METABOLIC PNL TOTAL CA: CPT | Performed by: EMERGENCY MEDICINE

## 2020-10-05 PROCEDURE — 80048 BASIC METABOLIC PNL TOTAL CA: CPT

## 2020-10-05 PROCEDURE — 99284 EMERGENCY DEPT VISIT MOD MDM: CPT

## 2020-10-05 PROCEDURE — 93000 ELECTROCARDIOGRAM COMPLETE: CPT | Performed by: EMERGENCY MEDICINE

## 2020-10-05 PROCEDURE — 36415 COLL VENOUS BLD VENIPUNCTURE: CPT

## 2020-10-05 PROCEDURE — 85025 COMPLETE CBC W/AUTO DIFF WBC: CPT | Performed by: EMERGENCY MEDICINE

## 2020-10-05 NOTE — ED PROVIDER NOTES
Patient Seen in: [facility] [address] [place] [address]    History   Patient presents with:  Fall    Stated Complaint: rt rib injury    HPI    Patient complains of left-sided chest pain that started today, patient states that she fell in the bathtu Tobacco Use      Smoking status: Never Smoker      Smokeless tobacco: Never Used    Alcohol use: No      Alcohol/week: 0.0 standard drinks    Drug use: No      Review of Systems    Positive for stated complaint: rt rib injury  Other systems are as noted in pm    Follow-up:  No follow-up provider specified.         Medications Prescribed:  Current Discharge Medication List                      Disposition and Plan     Clinical Impression:  Chest pain of uncertain etiology  (primary encounter diagnosis)    Disp

## 2020-10-05 NOTE — ED INITIAL ASSESSMENT (HPI)
Patient presents to ER with c/o chest pain and SOB starting yesterday. Patient sent from [facility] for further workup. Patient also states she fell 1 week ago. Mechanical fall while in the shower. Denies Hitting head or LOC.  C/o right rib pain

## 2020-10-05 NOTE — TELEPHONE ENCOUNTER
Pt stated that last week she fell. Pt slipped in the tub and she hit her side. Now she is having pain on the right side of her rib. If she does not move there is no pain if she  a glass she gets 3/10 pain.  Pt stated that she did feel a little short

## 2020-10-05 NOTE — ED NOTES
Patient states the chest pain is worse with inhalation. No respiratory distress noted.  Family at bedside

## 2020-10-06 NOTE — ED PROVIDER NOTES
Patient Seen in: [facility] Emergency Department    History   Patient presents with:  Chest Pain      HPI    The patient presents to the ED complaining of chest pain and right sided rib pain starting several days ago after falling almost a week ag reviewed from today, pertinent positives to the presenting problem noted.     Physical Exam     ED Triage Vitals [10/05/20 1333]   BP (!) 172/95   Pulse 74   Resp 20   Temp 98.3 °F (36.8 °C)   Temp src Oral   SpO2 100 %   O2 Device None (Room air)       All Absolute 0.67 (*)     All other components within normal limits   TROPONIN I - Normal   CBC WITH DIFFERENTIAL WITH PLATELET    Narrative: The following orders were created for panel order CBC WITH DIFFERENTIAL WITH PLATELET.                   Procedure sternal pain after falling a week ago. X-ray unremarkable for bony injury. No pneumothorax and patient with symptoms typical for musculoskeletal injury. Stable for discharge home with outpatient follow-up and supportive care measures.     Additional verb

## 2021-01-05 NOTE — TELEPHONE ENCOUNTER
Requested Prescriptions     Pending Prescriptions Disp Refills   • methotrexate 2.5 MG Oral Tab 60 tablet 1     Sig: Take 5 tablets (12.5 mg total) by mouth once a week.      LF: 7/17/20 #60 TAB W/ 1 RF  LOV: 7/19/19   Future Appointments   Date Time Provid diagnosed with RA approxi-35 years ago and is been on methotrexate 5 pills weekly for the past 20 years. Denies any morning stiffness, joint swelling or pain.   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     Rheumatoid arthritis

## 2021-01-26 RX ORDER — METHOTREXATE 2.5 MG/1
12.5 TABLET ORAL WEEKLY
Qty: 20 TABLET | Refills: 0 | Status: SHIPPED | OUTPATIENT
Start: 2021-01-26 | End: 2021-02-17

## 2021-01-26 NOTE — TELEPHONE ENCOUNTER
Pt needs methotrexate refill , denied refill 1/5/21 , no OV since 7/2019. Refill until next OV scheduled 2/8/2021 ? LOV: 7/2019  Future Appointments   Date Time Provider Nathaly Lugo   2/8/2021  1:40 PM Garth Ruffin MD 2014 [facility] [address]   3/1/2021  1:00 PM Ariel Sutton MD [id number] [place] [address]    LABS:Results for Matti Ahmadi (MRN [id number]) as of 1/26/2021 13:55   Ref. Range 10/5/2020 14:59   Glucose Latest Ref Range: 70 - 99 mg/dL 101 (H)   Sodium Latest Ref Range: 136 - 145 mmol/L 136   Potassium Latest Ref Range: 3.5 - 5.1 mmol/L 3.8   Chloride Latest Ref Range: 98 - 112 mmol/L 101   Carbon Dioxide, Total Latest Ref Range: 21.0 - 32.0 mmol/L 31.0   BUN Latest Ref Range: 7 - 18 mg/dL 14   CREATININE Latest Ref Range: 0.55 - 1.02 mg/dL 0.82   CALCIUM Latest Ref Range: 8.5 - 10.1 mg/dL 9.5   BUN/CREAT Ratio Latest Ref Range: 10.0 - 20.0  17.1   eGFR NON-AFR.  AMERICAN Latest Ref Range: >=60  69   eGFR AFRICAN AMERICAN Latest Ref Range: >=60  79   ANION GAP Latest Ref Range: 0 - 18 mmol/L 4   CALCULATED OSMOLALITY Latest Ref Range: 275 - 295 mOsm/kg 283   TROPONIN Latest Ref Range: <0.045 ng/mL <0.045   WBC Latest Ref Range: 4.0 - 11.0 x10(3) uL 10.3   Hemoglobin Latest Ref Range: 12.0 - 16.0 g/dL 12.6   Hematocrit Latest Ref Range: 35.0 - 48.0 % 36.0   Platelet Count Latest Ref Range: 150.0 - 450.0 10(3)uL 212.0   RBC Latest Ref Range: 3.80 - 5.30 x10(6)uL 3.80   MCH Latest Ref Range: 26.0 - 34.0 pg 33.2   MCHC Latest Ref Range: 31.0 - 37.0 g/dL 35.0   MCV Latest Ref Range: 80.0 - 100.0 fL 94.7   RDW Latest Ref Range: 11.0 - 15.0 % 13.7   RDW-SD Latest Ref Range: 35.1 - 46.3 fL 46.1   Prelim Neutrophil Abs Latest Ref Range: 1.50 - 7.70 x10 (3) uL 8.55 (H)   Neutrophils Absolute Latest Ref Range: 1.50 - 7.70 x10(3) uL 8.55 (H)   Lymphocytes Absolute Latest Ref Range: 1.00 - 4.00 x10(3) uL 0.67 (L)   Monocytes Absolute Latest Ref Range: 0.10 - 1.00 x10(3) uL 0.93   Eosinophils Absolute Latest Ref Range: 0.00 - 0.70 x10(3) uL 0.07   Basophils Absolute Latest Ref Range: 0.00 - 0.20 x10(3) uL 0.02   Immature Granulocyte Absolute Latest Ref Range: 0.00 - 1.00 x10(3) uL 0.05   Neutrophils % Latest Units: % 83.1   Lymphocytes % Latest Units: % 6.5   Monocytes % Latest Units: % 9.0   Eosinophils % Latest Units: % 0.7   Basophils % Latest Units: % 0.2   Immature Granulocyte % Latest Units: % 0.5

## 2021-01-26 NOTE — TELEPHONE ENCOUNTER
Patient is requesting a refill of her medication to last her until her appointment on 2/8.      methotrexate 2.5 MG Oral Tab

## 2021-02-15 NOTE — TELEPHONE ENCOUNTER
Attempted to call patient 3 times with [facility] for her visit today. She was not able to connect. Sh was last seen in July 2019 for rheumatoid arthritis and is on methotrexate.  She will need to do an in person visit if she is not able to connect with video

## 2021-02-15 NOTE — TELEPHONE ENCOUNTER
Called pt cell - no voicemail set up   Home phone - unable to leave message no voicemail . Contact efrain Gonzalez  Same home number .  Will attempt to call again

## 2021-02-17 NOTE — TELEPHONE ENCOUNTER
Spoke with pt today , has F/U appt on 3/1/21.  Asking for refill on methotrexate x1 until F/U appt   Rx pending . ( 5 tabs)

## 2021-03-01 ENCOUNTER — LAB ENCOUNTER (OUTPATIENT)
Dept: LAB | Facility: HOSPITAL | Age: 81
End: 2021-03-01
Attending: INTERNAL MEDICINE
Payer: MEDICARE

## 2021-03-01 ENCOUNTER — OFFICE VISIT (OUTPATIENT)
Dept: RHEUMATOLOGY | Facility: CLINIC | Age: 81
End: 2021-03-01
Payer: MEDICARE

## 2021-03-01 VITALS
SYSTOLIC BLOOD PRESSURE: 146 MMHG | HEIGHT: 61 IN | HEART RATE: 64 BPM | DIASTOLIC BLOOD PRESSURE: 85 MMHG | WEIGHT: 115 LBS | BODY MASS INDEX: 21.71 KG/M2

## 2021-03-01 DIAGNOSIS — M05.9 SEROPOSITIVE RHEUMATOID ARTHRITIS (HCC): ICD-10-CM

## 2021-03-01 DIAGNOSIS — M05.9 SEROPOSITIVE RHEUMATOID ARTHRITIS (HCC): Primary | ICD-10-CM

## 2021-03-01 DIAGNOSIS — Z51.81 MEDICATION MONITORING ENCOUNTER: ICD-10-CM

## 2021-03-01 LAB
ALBUMIN SERPL-MCNC: 4.1 G/DL (ref 3.4–5)
ALT SERPL-CCNC: 17 U/L
AST SERPL-CCNC: 19 U/L (ref 15–37)
BASOPHILS # BLD AUTO: 0.04 X10(3) UL (ref 0–0.2)
BASOPHILS NFR BLD AUTO: 0.6 %
CREAT BLD-MCNC: 0.95 MG/DL
CRP SERPL-MCNC: <0.29 MG/DL (ref ?–0.3)
DEPRECATED RDW RBC AUTO: 41 FL (ref 35.1–46.3)
EOSINOPHIL # BLD AUTO: 0.22 X10(3) UL (ref 0–0.7)
EOSINOPHIL NFR BLD AUTO: 3 %
ERYTHROCYTE [DISTWIDTH] IN BLOOD BY AUTOMATED COUNT: 12.8 % (ref 11–15)
ERYTHROCYTE [SEDIMENTATION RATE] IN BLOOD: 19 MM/HR
HCT VFR BLD AUTO: 40.2 %
HGB BLD-MCNC: 13.7 G/DL
IMM GRANULOCYTES # BLD AUTO: 0.01 X10(3) UL (ref 0–1)
IMM GRANULOCYTES NFR BLD: 0.1 %
LYMPHOCYTES # BLD AUTO: 1.16 X10(3) UL (ref 1–4)
LYMPHOCYTES NFR BLD AUTO: 16 %
MCH RBC QN AUTO: 31.4 PG (ref 26–34)
MCHC RBC AUTO-ENTMCNC: 34.1 G/DL (ref 31–37)
MCV RBC AUTO: 92 FL
MONOCYTES # BLD AUTO: 0.58 X10(3) UL (ref 0.1–1)
MONOCYTES NFR BLD AUTO: 8 %
NEUTROPHILS # BLD AUTO: 5.24 X10 (3) UL (ref 1.5–7.7)
NEUTROPHILS # BLD AUTO: 5.24 X10(3) UL (ref 1.5–7.7)
NEUTROPHILS NFR BLD AUTO: 72.3 %
PLATELET # BLD AUTO: 233 10(3)UL (ref 150–450)
RBC # BLD AUTO: 4.37 X10(6)UL
WBC # BLD AUTO: 7.3 X10(3) UL (ref 4–11)

## 2021-03-01 PROCEDURE — 86140 C-REACTIVE PROTEIN: CPT

## 2021-03-01 PROCEDURE — 82040 ASSAY OF SERUM ALBUMIN: CPT

## 2021-03-01 PROCEDURE — 84450 TRANSFERASE (AST) (SGOT): CPT

## 2021-03-01 PROCEDURE — 85025 COMPLETE CBC W/AUTO DIFF WBC: CPT

## 2021-03-01 PROCEDURE — 36415 COLL VENOUS BLD VENIPUNCTURE: CPT

## 2021-03-01 PROCEDURE — 84460 ALANINE AMINO (ALT) (SGPT): CPT

## 2021-03-01 PROCEDURE — 82565 ASSAY OF CREATININE: CPT

## 2021-03-01 PROCEDURE — 85652 RBC SED RATE AUTOMATED: CPT

## 2021-03-01 PROCEDURE — 99214 OFFICE O/P EST MOD 30 MIN: CPT | Performed by: INTERNAL MEDICINE

## 2021-03-01 RX ORDER — FOLIC ACID 1 MG/1
1 TABLET ORAL DAILY
Qty: 90 TABLET | Refills: 1 | Status: SHIPPED | OUTPATIENT
Start: 2021-03-01

## 2021-03-01 RX ORDER — SENNOSIDES 8.6 MG
650 CAPSULE ORAL EVERY 8 HOURS PRN
Qty: 90 TABLET | Refills: 0 | Status: SHIPPED | OUTPATIENT
Start: 2021-03-01 | End: 2021-04-02

## 2021-03-01 NOTE — PROGRESS NOTES
Tiana Wang is a 68year old female. HPI:   Patient presents with:  Medication Follow-Up  Rheumatoid Arthritis      I had the pleasure of seeing Tiana Wang on 3/1/2021 for follow up Seropositive RA. Last seen July 2019    Current Medications:   Serina Bees Take 1 tablet (1 mg total) by mouth daily. 90 tablet 1   • cholecalciferol ( VITAMIN D) 1000 UNITS Oral Cap Take  by mouth. • Ibuprofen (ADVIL) 200 MG Oral Cap Take  by mouth.        .cmed  Allergies:  No Known Allergies      ROS:   All other ROS are morning and 2 pills at night due to some stiffness  - Recommended to continue methotrexate 5 pills weekly and folic acid daily.  - Plan to get blood work today and in July  - Advised to stop Advil if her pain is not severe.   She can take Tylenol arthritis

## 2021-03-01 NOTE — PATIENT INSTRUCTIONS
You were seen today for rheumatoid arthritis  Continue methotrexate 5 pills weekly and folic acid daily  Would recommend to stop Advil as it can cause worsening kidney and heart issues  Can take Tylenol arthritis 1 pill every 8 hours as needed  Blood work

## 2021-03-12 DIAGNOSIS — Z23 NEED FOR VACCINATION: ICD-10-CM

## 2021-03-15 ENCOUNTER — OFFICE VISIT (OUTPATIENT)
Dept: INTERNAL MEDICINE CLINIC | Facility: CLINIC | Age: 81
End: 2021-03-15
Payer: MEDICARE

## 2021-03-15 VITALS
HEIGHT: 61 IN | OXYGEN SATURATION: 97 % | WEIGHT: 113 LBS | HEART RATE: 87 BPM | DIASTOLIC BLOOD PRESSURE: 85 MMHG | SYSTOLIC BLOOD PRESSURE: 134 MMHG | BODY MASS INDEX: 21.34 KG/M2

## 2021-03-15 DIAGNOSIS — Z00.00 ANNUAL PHYSICAL EXAM: Primary | ICD-10-CM

## 2021-03-15 DIAGNOSIS — Z00.00 ENCOUNTER FOR ANNUAL HEALTH EXAMINATION: ICD-10-CM

## 2021-03-15 DIAGNOSIS — M81.6 LOCALIZED OSTEOPOROSIS WITHOUT CURRENT PATHOLOGICAL FRACTURE: ICD-10-CM

## 2021-03-15 DIAGNOSIS — M85.80 OSTEOPENIA, UNSPECIFIED LOCATION: ICD-10-CM

## 2021-03-15 LAB
CHOLEST SMN-MCNC: 159 MG/DL (ref ?–200)
HDLC SERPL-MCNC: 60 MG/DL (ref 40–59)
LDLC SERPL CALC-MCNC: 69 MG/DL (ref ?–100)
NONHDLC SERPL-MCNC: 99 MG/DL (ref ?–130)
PATIENT FASTING Y/N/NP: YES
TRIGL SERPL-MCNC: 148 MG/DL (ref 30–149)
VLDLC SERPL CALC-MCNC: 30 MG/DL (ref 0–30)

## 2021-03-15 PROCEDURE — 36415 COLL VENOUS BLD VENIPUNCTURE: CPT | Performed by: INTERNAL MEDICINE

## 2021-03-15 PROCEDURE — G0439 PPPS, SUBSEQ VISIT: HCPCS | Performed by: INTERNAL MEDICINE

## 2021-03-15 NOTE — PROGRESS NOTES
HPI:   Meaghan Ovalle is a 68year old female who presents for a Medicare Subsequent Annual Wellness visit (Pt already had Initial Annual Wellness).     Her last annual assessment has been over 1 year: Annual Physical due on 02/17/2021         Fall/Risk Asse to patient in AVS     She does NOT have a Power of  for [employer] on file in [place].    Advance care planning including the explanation and discussion of advance directives standard forms performed Face to Face with patient and Family/surrogate (if Hr, Take 1 tablet (50 mg total) by mouth daily. cholecalciferol ( VITAMIN D) 1000 UNITS Oral Cap, Take  by mouth.        MEDICAL INFORMATION:   She  has a past medical history of Rheumatoid arthritis ([address] Unspecified essential hypertension, and Uterine time: No   I have trouble understanding things on the TV: No I have to strain to understand conversations: No   I have to worry about missing the telephone ring or doorbell: No I have trouble hearing conversations in a noisy background such as a crowded ro Neurological:      Mental Status: She is alert and oriented to person, place, and time. Deep Tendon Reflexes: Reflexes are normal and symmetric. Psychiatric:         Behavior: Behavior normal.         Thought Content:  Thought content normal. mental well-being?: Games; Social Interaction      This section provided for quick review of chart, separate sheet to patient  [address] Internal Lab or Procedure External Lab or Procedure   Diabetes Screening      HbgA (Prevnar)  Covered Once after 65 03/30/2016 Please get once after your 65th birthday    Pneumococcal 23 (Pneumovax)  Covered Once after 65 10/19/2016 Please get once after your 65th birthday    Hepatitis B for Moderate/High Risk No vaccine history found Me

## 2021-03-15 NOTE — PATIENT INSTRUCTIONS
Adolfo Grissom's SCREENING SCHEDULE   Tests on this list are recommended by your physician but may not be covered, or covered at this frequency, by your insurer. Please check with your insurance carrier before scheduling to verify coverage.    PREVENTATIVE often if abnormal There are no preventive care reminders to display for this patient. Update Health Maintenance if applicable    Flex Sigmoidoscopy Screen  Covered every 5 years No results found for this or any previous visit. No flowsheet data found. visit on 10/21/14   • FLU VAC NO PRSV 4 ANAMIKA 3 YRS+    Please get every year    Pneumococcal 13 (Prevnar)  Covered Once after 65 Orders placed or performed in visit on 03/30/16   • PNEUMOCOCCAL VACC, 13 ANAMIKA IM    Please get once after your 65th birthday

## 2021-03-27 RX ORDER — METOPROLOL SUCCINATE 50 MG/1
TABLET, EXTENDED RELEASE ORAL
Qty: 90 TABLET | Refills: 1 | Status: SHIPPED | OUTPATIENT
Start: 2021-03-27 | End: 2021-09-20

## 2021-03-27 RX ORDER — HYDROCHLOROTHIAZIDE 12.5 MG/1
CAPSULE, GELATIN COATED ORAL
Qty: 90 CAPSULE | Refills: 1 | Status: SHIPPED | OUTPATIENT
Start: 2021-03-27 | End: 2021-09-20

## 2021-04-01 ENCOUNTER — TELEPHONE (OUTPATIENT)
Dept: RHEUMATOLOGY | Facility: CLINIC | Age: 81
End: 2021-04-01

## 2021-04-01 NOTE — TELEPHONE ENCOUNTER
Patient is requesting medication refill for     Acetaminophen ER (TYLENOL 8 HOUR ARTHRITIS PAIN) 650 MG Oral Tab CR    Please send to [facility] in [place], [address] [facility]

## 2021-04-02 RX ORDER — SENNOSIDES 8.6 MG
650 CAPSULE ORAL EVERY 8 HOURS PRN
Qty: 90 TABLET | Refills: 1 | Status: SHIPPED | OUTPATIENT
Start: 2021-04-02 | End: 2021-06-02

## 2021-06-02 RX ORDER — SENNOSIDES 8.6 MG
650 CAPSULE ORAL EVERY 8 HOURS PRN
Qty: 90 TABLET | Refills: 1 | Status: SHIPPED | OUTPATIENT
Start: 2021-06-02

## 2021-06-02 NOTE — TELEPHONE ENCOUNTER
Requested Prescriptions     Pending Prescriptions Disp Refills   • Acetaminophen ER (TYLENOL 8 HOUR ARTHRITIS PAIN) 650 MG Oral Tab CR 90 tablet 1     Sig: Take 1 tablet (650 mg total) by mouth every 8 (eight) hours as needed for Pain.      LF: 4/2/21 #90 T continue methotrexate 5 pills weekly and folic acid daily.  - Plan to get blood work today and in July  - Advised to stop Advil if her pain is not severe.   She can take Tylenol arthritis 650 mg 1-2 times a day as needed if needed.     Pt will f/u in 6 mos

## 2021-08-14 ENCOUNTER — APPOINTMENT (OUTPATIENT)
Dept: CT IMAGING | Facility: HOSPITAL | Age: 81
End: 2021-08-14
Attending: EMERGENCY MEDICINE
Payer: MEDICARE

## 2021-08-14 ENCOUNTER — HOSPITAL ENCOUNTER (EMERGENCY)
Facility: HOSPITAL | Age: 81
Discharge: HOME | End: 2021-08-14
Attending: EMERGENCY MEDICINE
Payer: MEDICARE

## 2021-08-14 ENCOUNTER — APPOINTMENT (OUTPATIENT)
Dept: GENERAL RADIOLOGY | Facility: HOSPITAL | Age: 81
End: 2021-08-14
Attending: EMERGENCY MEDICINE
Payer: MEDICARE

## 2021-08-14 VITALS
BODY MASS INDEX: 22.19 KG/M2 | RESPIRATION RATE: 17 BRPM | OXYGEN SATURATION: 98 % | HEART RATE: 61 BPM | WEIGHT: 113 LBS | HEIGHT: 60 IN | SYSTOLIC BLOOD PRESSURE: 119 MMHG | DIASTOLIC BLOOD PRESSURE: 63 MMHG | TEMPERATURE: 97 F

## 2021-08-14 DIAGNOSIS — S09.8XXA BLUNT HEAD TRAUMA, INITIAL ENCOUNTER: ICD-10-CM

## 2021-08-14 DIAGNOSIS — S32.591A CLOSED FRACTURE OF MULTIPLE RAMI OF RIGHT PUBIS, INITIAL ENCOUNTER (HCC): Primary | ICD-10-CM

## 2021-08-14 LAB
ANION GAP SERPL CALC-SCNC: 7 MMOL/L (ref 0–18)
BASOPHILS # BLD AUTO: 0.01 X10(3) UL (ref 0–0.2)
BASOPHILS NFR BLD AUTO: 0.1 %
BUN BLD-MCNC: 13 MG/DL (ref 7–18)
BUN/CREAT SERPL: 18.1 (ref 10–20)
CALCIUM BLD-MCNC: 8.6 MG/DL (ref 8.5–10.1)
CHLORIDE SERPL-SCNC: 101 MMOL/L (ref 98–112)
CO2 SERPL-SCNC: 27 MMOL/L (ref 21–32)
CREAT BLD-MCNC: 0.72 MG/DL
DEPRECATED RDW RBC AUTO: 42.9 FL (ref 35.1–46.3)
EOSINOPHIL # BLD AUTO: 0.04 X10(3) UL (ref 0–0.7)
EOSINOPHIL NFR BLD AUTO: 0.6 %
ERYTHROCYTE [DISTWIDTH] IN BLOOD BY AUTOMATED COUNT: 12.7 % (ref 11–15)
GLUCOSE BLD-MCNC: 111 MG/DL (ref 70–99)
HCT VFR BLD AUTO: 34.4 %
HGB BLD-MCNC: 11.9 G/DL
IMM GRANULOCYTES # BLD AUTO: 0.03 X10(3) UL (ref 0–1)
IMM GRANULOCYTES NFR BLD: 0.4 %
LYMPHOCYTES # BLD AUTO: 0.6 X10(3) UL (ref 1–4)
LYMPHOCYTES NFR BLD AUTO: 9 %
MCH RBC QN AUTO: 33 PG (ref 26–34)
MCHC RBC AUTO-ENTMCNC: 34.6 G/DL (ref 31–37)
MCV RBC AUTO: 95.3 FL
MONOCYTES # BLD AUTO: 0.66 X10(3) UL (ref 0.1–1)
MONOCYTES NFR BLD AUTO: 9.9 %
NEUTROPHILS # BLD AUTO: 5.36 X10 (3) UL (ref 1.5–7.7)
NEUTROPHILS # BLD AUTO: 5.36 X10(3) UL (ref 1.5–7.7)
NEUTROPHILS NFR BLD AUTO: 80 %
OSMOLALITY SERPL CALC.SUM OF ELEC: 281 MOSM/KG (ref 275–295)
PLATELET # BLD AUTO: 190 10(3)UL (ref 150–450)
POTASSIUM SERPL-SCNC: 3.2 MMOL/L (ref 3.5–5.1)
RBC # BLD AUTO: 3.61 X10(6)UL
SODIUM SERPL-SCNC: 135 MMOL/L (ref 136–145)
WBC # BLD AUTO: 6.7 X10(3) UL (ref 4–11)

## 2021-08-14 PROCEDURE — 80048 BASIC METABOLIC PNL TOTAL CA: CPT | Performed by: EMERGENCY MEDICINE

## 2021-08-14 PROCEDURE — 85025 COMPLETE CBC W/AUTO DIFF WBC: CPT | Performed by: EMERGENCY MEDICINE

## 2021-08-14 PROCEDURE — 99284 EMERGENCY DEPT VISIT MOD MDM: CPT

## 2021-08-14 PROCEDURE — 70450 CT HEAD/BRAIN W/O DYE: CPT | Performed by: EMERGENCY MEDICINE

## 2021-08-14 PROCEDURE — 93005 ELECTROCARDIOGRAM TRACING: CPT

## 2021-08-14 PROCEDURE — 73502 X-RAY EXAM HIP UNI 2-3 VIEWS: CPT | Performed by: EMERGENCY MEDICINE

## 2021-08-14 PROCEDURE — 36415 COLL VENOUS BLD VENIPUNCTURE: CPT

## 2021-08-14 PROCEDURE — 93010 ELECTROCARDIOGRAM REPORT: CPT | Performed by: EMERGENCY MEDICINE

## 2021-08-14 RX ORDER — HYDROCODONE BITARTRATE AND ACETAMINOPHEN 5; 325 MG/1; MG/1
1 TABLET ORAL EVERY 6 HOURS PRN
Qty: 16 TABLET | Refills: 0 | Status: SHIPPED | OUTPATIENT
Start: 2021-08-14 | End: 2021-08-21

## 2021-08-14 NOTE — ED PROVIDER NOTES
Patient Seen in: [facility] AND [facility] Emergency Department      History   Patient presents with:  Fall  Laceration/Abrasion    Stated Complaint: FALL LAST NIGHT    HPI/Subjective:   HPI    31-year-old female with past medical history significant for rheumat (36.2 °C)   Temp src Temporal   SpO2 98 %   O2 Device None (Room air)       Current:/53   Pulse 66   Temp 97.1 °F (36.2 °C) (Temporal)   Resp 20   Ht 152.4 cm (5')   Wt 51.3 kg   SpO2 99%   BMI 22.07 kg/m²         Physical Exam    Physical Exam   Con these tests on the individual orders. RAINBOW DRAW LAVENDER   RAINBOW DRAW LIGHT GREEN   RAINBOW DRAW GOLD     EKG    Rate, intervals and axes as noted on EKG Report.   Rate: 71 bpm  Rhythm: Sinus Rhythm  Reading: Normal sinus rhythm, no acute ST changes, days      We recommend that you schedule follow up care with a primary care provider within the next three months to obtain basic health screening including reassessment of your blood pressure.       Medications Prescribed:  Current Discharge Medication Lis

## 2021-08-14 NOTE — ED INITIAL ASSESSMENT (HPI)
Patient presents to ER after she fell on 0200 today. Patient does not recall falling; however, her  noted that she was bleeding from the back of her head after returning from the bathroom. Denies use of blood thinners.

## 2021-09-07 NOTE — TELEPHONE ENCOUNTER
Patient is requesting a refill on the following medication. Patient is completely out now.      Medication Detail    Medication Quantity Refills Start End   methotrexate 2.5 MG Oral Tab 60 tablet 1 3/1/2021    Sig:   Take 5 tablets (12.5 mg total) by mouth

## 2021-09-11 NOTE — TELEPHONE ENCOUNTER
Called and left a VM to patient, inform per Dr. Alexy Chavarria medication was Refused for refill. LOV was 7/19/19 Will not refill, she needs to follow up visit. CSS, please assist to schedule appointment. Thank you. PCP for Routine Care

## 2021-09-20 ENCOUNTER — OFFICE VISIT (OUTPATIENT)
Dept: INTERNAL MEDICINE CLINIC | Facility: CLINIC | Age: 81
End: 2021-09-20
Payer: MEDICARE

## 2021-09-20 VITALS
HEIGHT: 60 IN | SYSTOLIC BLOOD PRESSURE: 135 MMHG | DIASTOLIC BLOOD PRESSURE: 85 MMHG | HEART RATE: 66 BPM | WEIGHT: 113 LBS | BODY MASS INDEX: 22.19 KG/M2

## 2021-09-20 DIAGNOSIS — R55 SYNCOPE, UNSPECIFIED SYNCOPE TYPE: Primary | ICD-10-CM

## 2021-09-20 DIAGNOSIS — S32.301D CLOSED NONDISPLACED FRACTURE OF RIGHT ILIUM WITH ROUTINE HEALING, UNSPECIFIED FRACTURE MORPHOLOGY, SUBSEQUENT ENCOUNTER: ICD-10-CM

## 2021-09-20 PROCEDURE — 99214 OFFICE O/P EST MOD 30 MIN: CPT | Performed by: INTERNAL MEDICINE

## 2021-09-20 PROCEDURE — 1111F DSCHRG MED/CURRENT MED MERGE: CPT | Performed by: INTERNAL MEDICINE

## 2021-09-20 RX ORDER — HYDROCHLOROTHIAZIDE 12.5 MG/1
12.5 CAPSULE, GELATIN COATED ORAL DAILY
Qty: 90 CAPSULE | Refills: 1 | Status: SHIPPED | OUTPATIENT
Start: 2021-09-20 | End: 2021-12-27

## 2021-09-20 RX ORDER — METOPROLOL SUCCINATE 50 MG/1
50 TABLET, EXTENDED RELEASE ORAL DAILY
Qty: 90 TABLET | Refills: 1 | Status: SHIPPED | OUTPATIENT
Start: 2021-09-20 | End: 2021-12-27

## 2021-09-20 NOTE — PROGRESS NOTES
Subjective:     Patient ID: Loy Gutierrez is a 80year old female. She came in today for follow up after discharge from the hospital   She went to er because of the fall . She doesn't remember how did she fall , ? Lost consciousness.   She denies any ches tablet 1     Allergies:No Known Allergies    Past Medical History:   Diagnosis Date   • Rheumatoid arthritis ([facility]    • Unspecified essential hypertension    • Uterine prolapse       Past Surgical History:   Procedure Laterality Date   • HYSTERECTOMY  July were placed in this encounter. Meds This Visit:  Requested Prescriptions     Signed Prescriptions Disp Refills   • hydroCHLOROthiazide 12.5 MG Oral Cap 90 capsule 1     Sig: Take 1 capsule (12.5 mg total) by mouth daily.    • metoprolol succinate 50 MG

## 2021-09-21 ENCOUNTER — TELEPHONE (OUTPATIENT)
Dept: INTERNAL MEDICINE CLINIC | Facility: CLINIC | Age: 81
End: 2021-09-21

## 2021-09-21 NOTE — TELEPHONE ENCOUNTER
Valentine Tan from [facility] calling wanting to let DR know Due to being fully booked she will not be able to start pt for Physical Therapy until 9/28. Requesting Note stating that 9/28 start of care is ok.      Please advise

## 2021-09-23 RX ORDER — METOPROLOL SUCCINATE 50 MG/1
TABLET, EXTENDED RELEASE ORAL
Qty: 90 TABLET | Refills: 1 | OUTPATIENT
Start: 2021-09-23

## 2021-09-23 RX ORDER — HYDROCHLOROTHIAZIDE 12.5 MG/1
CAPSULE, GELATIN COATED ORAL
Qty: 90 CAPSULE | Refills: 1 | OUTPATIENT
Start: 2021-09-23

## 2021-09-29 ENCOUNTER — TELEPHONE (OUTPATIENT)
Dept: INTERNAL MEDICINE CLINIC | Facility: CLINIC | Age: 81
End: 2021-09-29

## 2021-09-29 NOTE — TELEPHONE ENCOUNTER
Per Michelle/JAMIR; start of care Home Health services started today 09/29/2021; and the plan of care is to see patient one time a week on the 1st week; 2 visit in the 2nd week; and one visit on 3rd, and 1 visit on the 4th and the 5th week.

## 2021-09-29 NOTE — TELEPHONE ENCOUNTER
Nuha from [facility] informed that Dr. Adina Caruso is giving ok for pt's PT plan.

## 2021-10-05 ENCOUNTER — OFFICE VISIT (OUTPATIENT)
Dept: ORTHOPEDICS CLINIC | Facility: CLINIC | Age: 81
End: 2021-10-05
Payer: MEDICARE

## 2021-10-05 ENCOUNTER — HOSPITAL ENCOUNTER (OUTPATIENT)
Dept: GENERAL RADIOLOGY | Facility: HOSPITAL | Age: 81
Discharge: HOME | End: 2021-10-05
Attending: ORTHOPAEDIC SURGERY
Payer: MEDICARE

## 2021-10-05 DIAGNOSIS — M25.551 RIGHT HIP PAIN: ICD-10-CM

## 2021-10-05 DIAGNOSIS — S32.591D INFERIOR PUBIC RAMUS FRACTURE, RIGHT, WITH ROUTINE HEALING, SUBSEQUENT ENCOUNTER: Primary | ICD-10-CM

## 2021-10-05 PROCEDURE — 27197 CLSD TX PELVIC RING FX: CPT | Performed by: ORTHOPAEDIC SURGERY

## 2021-10-05 PROCEDURE — 99203 OFFICE O/P NEW LOW 30 MIN: CPT | Performed by: ORTHOPAEDIC SURGERY

## 2021-10-05 PROCEDURE — 73502 X-RAY EXAM HIP UNI 2-3 VIEWS: CPT | Performed by: ORTHOPAEDIC SURGERY

## 2021-10-05 NOTE — H&P
NURSING INTAKE COMMENTS: Patient presents with:  New Patient: Pelvic fracture, happened on 8/14/21. Patient sent to the washroom during the night, on her way back to bed she fell. Patient went into the ER on 8/15/21.  Patient is here to make sure the fractu Father 36   • Diabetes Sister 62        Cause of death   • Lung Disorder Brother         COPD       Social History    Occupational History      Not on file    Tobacco Use      Smoking status: Never Smoker      Smokeless tobacco: Never Used    Vaping Use Results   Component Value Date     (H) 08/14/2021    BUN 13 08/14/2021    CREATSERUM 0.72 08/14/2021    GFRNAA 80 08/14/2021    GFRAA 92 08/14/2021        Assessment and Plan:  Diagnoses and all orders for this visit:    Inferior pubic ramus fractur

## 2021-10-06 ENCOUNTER — MED REC SCAN ONLY (OUTPATIENT)
Dept: INTERNAL MEDICINE CLINIC | Facility: CLINIC | Age: 81
End: 2021-10-06

## 2021-10-19 ENCOUNTER — HOSPITAL ENCOUNTER (OUTPATIENT)
Dept: CV DIAGNOSTICS | Facility: HOSPITAL | Age: 81
Discharge: HOME | End: 2021-10-19
Attending: INTERNAL MEDICINE
Payer: MEDICARE

## 2021-10-19 ENCOUNTER — HOSPITAL ENCOUNTER (OUTPATIENT)
Dept: ULTRASOUND IMAGING | Facility: HOSPITAL | Age: 81
Discharge: HOME | End: 2021-10-19
Attending: INTERNAL MEDICINE
Payer: MEDICARE

## 2021-10-19 DIAGNOSIS — R55 SYNCOPE, UNSPECIFIED SYNCOPE TYPE: ICD-10-CM

## 2021-10-19 PROCEDURE — 93306 TTE W/DOPPLER COMPLETE: CPT | Performed by: INTERNAL MEDICINE

## 2021-10-19 PROCEDURE — 93880 EXTRACRANIAL BILAT STUDY: CPT | Performed by: INTERNAL MEDICINE

## 2021-10-21 ENCOUNTER — TELEPHONE (OUTPATIENT)
Dept: INTERNAL MEDICINE CLINIC | Facility: CLINIC | Age: 81
End: 2021-10-21

## 2021-10-21 NOTE — TELEPHONE ENCOUNTER
Nuha from [facility] home health would like to inform dr. Thierry Yanes that the patient will be discharged from home health services on 10/28

## 2021-11-29 ENCOUNTER — OFFICE VISIT (OUTPATIENT)
Dept: INTERNAL MEDICINE CLINIC | Facility: CLINIC | Age: 81
End: 2021-11-29
Payer: MEDICARE

## 2021-11-29 VITALS
SYSTOLIC BLOOD PRESSURE: 135 MMHG | HEIGHT: 60 IN | HEART RATE: 71 BPM | WEIGHT: 117 LBS | DIASTOLIC BLOOD PRESSURE: 80 MMHG | BODY MASS INDEX: 22.97 KG/M2

## 2021-11-29 DIAGNOSIS — I10 PRIMARY HYPERTENSION: Primary | ICD-10-CM

## 2021-11-29 PROCEDURE — G0008 ADMIN INFLUENZA VIRUS VAC: HCPCS | Performed by: INTERNAL MEDICINE

## 2021-11-29 PROCEDURE — 90662 IIV NO PRSV INCREASED AG IM: CPT | Performed by: INTERNAL MEDICINE

## 2021-11-29 PROCEDURE — 99213 OFFICE O/P EST LOW 20 MIN: CPT | Performed by: INTERNAL MEDICINE

## 2021-11-29 NOTE — PROGRESS NOTES
Subjective:     Patient ID: Mary Groves is a 80year old female.     HPI She came in today for follow-up on her blood pressure and she also wants to review her results from carotid Doppler and 2D echo      According to the patient she is checking her bloo Adam/Kaleigh jones w/bladder sling   • TOTAL ABDOM HYSTERECTOMY        Family History   Problem Relation Age of Onset   • Hypertension Father    • Diabetes Father 36   • Heart Disease Father 36   • Diabetes Sister 62        Cause of death   • Lung Diso requested or ordered in this encounter       Imaging & Referrals:  None

## 2021-12-27 RX ORDER — METOPROLOL SUCCINATE 50 MG/1
TABLET, EXTENDED RELEASE ORAL
Qty: 90 TABLET | Refills: 1 | Status: SHIPPED | OUTPATIENT
Start: 2021-12-27

## 2021-12-27 RX ORDER — HYDROCHLOROTHIAZIDE 12.5 MG/1
CAPSULE, GELATIN COATED ORAL
Qty: 90 CAPSULE | Refills: 1 | Status: SHIPPED | OUTPATIENT
Start: 2021-12-27

## 2022-02-25 RX ORDER — FOLIC ACID 1 MG/1
1 TABLET ORAL DAILY
Qty: 90 TABLET | Refills: 1 | Status: SHIPPED | OUTPATIENT
Start: 2022-02-25

## 2022-03-28 ENCOUNTER — OFFICE VISIT (OUTPATIENT)
Dept: RHEUMATOLOGY | Facility: CLINIC | Age: 82
End: 2022-03-28
Payer: MEDICARE

## 2022-03-28 ENCOUNTER — TELEPHONE (OUTPATIENT)
Dept: RHEUMATOLOGY | Facility: CLINIC | Age: 82
End: 2022-03-28

## 2022-03-28 ENCOUNTER — LAB ENCOUNTER (OUTPATIENT)
Dept: LAB | Facility: HOSPITAL | Age: 82
End: 2022-03-28
Attending: INTERNAL MEDICINE
Payer: MEDICARE

## 2022-03-28 VITALS
HEIGHT: 60 IN | HEART RATE: 76 BPM | DIASTOLIC BLOOD PRESSURE: 75 MMHG | WEIGHT: 118 LBS | BODY MASS INDEX: 23.16 KG/M2 | SYSTOLIC BLOOD PRESSURE: 177 MMHG

## 2022-03-28 DIAGNOSIS — M05.9 SEROPOSITIVE RHEUMATOID ARTHRITIS (HCC): ICD-10-CM

## 2022-03-28 DIAGNOSIS — Z51.81 MEDICATION MONITORING ENCOUNTER: ICD-10-CM

## 2022-03-28 DIAGNOSIS — M05.9 SEROPOSITIVE RHEUMATOID ARTHRITIS (HCC): Primary | ICD-10-CM

## 2022-03-28 LAB
ALBUMIN SERPL-MCNC: 4.2 G/DL (ref 3.4–5)
ALT SERPL-CCNC: 42 U/L
AST SERPL-CCNC: 36 U/L (ref 15–37)
BASOPHILS # BLD AUTO: 0.03 X10(3) UL (ref 0–0.2)
BASOPHILS NFR BLD AUTO: 0.5 %
CREAT BLD-MCNC: 0.78 MG/DL
CRP SERPL-MCNC: <0.29 MG/DL (ref ?–0.3)
DEPRECATED RDW RBC AUTO: 44.8 FL (ref 35.1–46.3)
EOSINOPHIL # BLD AUTO: 0.16 X10(3) UL (ref 0–0.7)
EOSINOPHIL NFR BLD AUTO: 2.5 %
ERYTHROCYTE [DISTWIDTH] IN BLOOD BY AUTOMATED COUNT: 13.2 % (ref 11–15)
ERYTHROCYTE [SEDIMENTATION RATE] IN BLOOD: 20 MM/HR
HCT VFR BLD AUTO: 36.1 %
HGB BLD-MCNC: 12.6 G/DL
IMM GRANULOCYTES # BLD AUTO: 0.03 X10(3) UL (ref 0–1)
IMM GRANULOCYTES NFR BLD: 0.5 %
LYMPHOCYTES # BLD AUTO: 1.08 X10(3) UL (ref 1–4)
LYMPHOCYTES NFR BLD AUTO: 16.9 %
MCH RBC QN AUTO: 33.5 PG (ref 26–34)
MCHC RBC AUTO-ENTMCNC: 34.9 G/DL (ref 31–37)
MCV RBC AUTO: 96 FL
MONOCYTES # BLD AUTO: 0.77 X10(3) UL (ref 0.1–1)
MONOCYTES NFR BLD AUTO: 12 %
NEUTROPHILS # BLD AUTO: 4.33 X10 (3) UL (ref 1.5–7.7)
NEUTROPHILS # BLD AUTO: 4.33 X10(3) UL (ref 1.5–7.7)
NEUTROPHILS NFR BLD AUTO: 67.6 %
PLATELET # BLD AUTO: 223 10(3)UL (ref 150–450)
RBC # BLD AUTO: 3.76 X10(6)UL
WBC # BLD AUTO: 6.4 X10(3) UL (ref 4–11)

## 2022-03-28 PROCEDURE — 86140 C-REACTIVE PROTEIN: CPT

## 2022-03-28 PROCEDURE — 82040 ASSAY OF SERUM ALBUMIN: CPT

## 2022-03-28 PROCEDURE — 84450 TRANSFERASE (AST) (SGOT): CPT

## 2022-03-28 PROCEDURE — 84460 ALANINE AMINO (ALT) (SGPT): CPT

## 2022-03-28 PROCEDURE — 85652 RBC SED RATE AUTOMATED: CPT

## 2022-03-28 PROCEDURE — 85025 COMPLETE CBC W/AUTO DIFF WBC: CPT

## 2022-03-28 PROCEDURE — 36415 COLL VENOUS BLD VENIPUNCTURE: CPT

## 2022-03-28 PROCEDURE — 82565 ASSAY OF CREATININE: CPT

## 2022-03-28 PROCEDURE — 99214 OFFICE O/P EST MOD 30 MIN: CPT | Performed by: INTERNAL MEDICINE

## 2022-03-28 NOTE — PATIENT INSTRUCTIONS
You were seen today for rheumatoid arthritis  You have chronic changes but your symptoms are stable  Continue methotrexate 5 pills weekly and folic acid every day  Blood work every 3 to 4 months  Orders are placed.   Next blood work will be in July or August  Can follow-up in a year as long as you get blood work done

## 2022-06-29 RX ORDER — FOLIC ACID 1 MG/1
1 TABLET ORAL DAILY
Qty: 90 TABLET | Refills: 1 | Status: SHIPPED | OUTPATIENT
Start: 2022-06-29

## 2022-09-21 RX ORDER — HYDROCHLOROTHIAZIDE 12.5 MG/1
CAPSULE, GELATIN COATED ORAL
Qty: 90 CAPSULE | Refills: 1 | Status: SHIPPED | OUTPATIENT
Start: 2022-09-21

## 2022-09-21 RX ORDER — METOPROLOL SUCCINATE 50 MG/1
TABLET, EXTENDED RELEASE ORAL
Qty: 90 TABLET | Refills: 1 | Status: SHIPPED | OUTPATIENT
Start: 2022-09-21

## 2022-12-06 RX ORDER — FOLIC ACID 1 MG/1
1 TABLET ORAL DAILY
Qty: 90 TABLET | Refills: 1 | Status: SHIPPED | OUTPATIENT
Start: 2022-12-06

## 2022-12-06 NOTE — TELEPHONE ENCOUNTER
Will only fill methotrexate for 1 month.   Last blood work was in March, she is to get blood work done and follow-up

## 2022-12-17 ENCOUNTER — LABORATORY ENCOUNTER (OUTPATIENT)
Dept: LAB | Age: 82
End: 2022-12-17
Attending: INTERNAL MEDICINE
Payer: MEDICARE

## 2022-12-17 DIAGNOSIS — Z51.81 MEDICATION MONITORING ENCOUNTER: ICD-10-CM

## 2022-12-17 DIAGNOSIS — M05.9 SEROPOSITIVE RHEUMATOID ARTHRITIS (HCC): ICD-10-CM

## 2022-12-17 LAB
ALBUMIN SERPL-MCNC: 3.8 G/DL (ref 3.4–5)
ALT SERPL-CCNC: 28 U/L
AST SERPL-CCNC: 37 U/L (ref 15–37)
BASOPHILS # BLD AUTO: 0.03 X10(3) UL (ref 0–0.2)
BASOPHILS NFR BLD AUTO: 0.5 %
CREAT BLD-MCNC: 0.76 MG/DL
CRP SERPL-MCNC: <0.29 MG/DL (ref ?–0.3)
EOSINOPHIL # BLD AUTO: 0.09 X10(3) UL (ref 0–0.7)
EOSINOPHIL NFR BLD AUTO: 1.6 %
ERYTHROCYTE [DISTWIDTH] IN BLOOD BY AUTOMATED COUNT: 13.2 %
ERYTHROCYTE [SEDIMENTATION RATE] IN BLOOD: 22 MM/HR
GFR SERPLBLD BASED ON 1.73 SQ M-ARVRAT: 79 ML/MIN/1.73M2 (ref 60–?)
HCT VFR BLD AUTO: 37.4 %
HGB BLD-MCNC: 12.6 G/DL
IMM GRANULOCYTES # BLD AUTO: 0.02 X10(3) UL (ref 0–1)
IMM GRANULOCYTES NFR BLD: 0.3 %
LYMPHOCYTES # BLD AUTO: 0.68 X10(3) UL (ref 1–4)
LYMPHOCYTES NFR BLD AUTO: 11.9 %
MCH RBC QN AUTO: 32.8 PG (ref 26–34)
MCHC RBC AUTO-ENTMCNC: 33.7 G/DL (ref 31–37)
MCV RBC AUTO: 97.4 FL
MONOCYTES # BLD AUTO: 0.55 X10(3) UL (ref 0.1–1)
MONOCYTES NFR BLD AUTO: 9.6 %
NEUTROPHILS # BLD AUTO: 4.36 X10 (3) UL (ref 1.5–7.7)
NEUTROPHILS # BLD AUTO: 4.36 X10(3) UL (ref 1.5–7.7)
NEUTROPHILS NFR BLD AUTO: 76.1 %
PLATELET # BLD AUTO: 197 10(3)UL (ref 150–450)
RBC # BLD AUTO: 3.84 X10(6)UL
WBC # BLD AUTO: 5.7 X10(3) UL (ref 4–11)

## 2022-12-17 PROCEDURE — 84450 TRANSFERASE (AST) (SGOT): CPT

## 2022-12-17 PROCEDURE — 85025 COMPLETE CBC W/AUTO DIFF WBC: CPT

## 2022-12-17 PROCEDURE — 86140 C-REACTIVE PROTEIN: CPT

## 2022-12-17 PROCEDURE — 85652 RBC SED RATE AUTOMATED: CPT

## 2022-12-17 PROCEDURE — 82565 ASSAY OF CREATININE: CPT

## 2022-12-17 PROCEDURE — 82040 ASSAY OF SERUM ALBUMIN: CPT

## 2022-12-17 PROCEDURE — 36415 COLL VENOUS BLD VENIPUNCTURE: CPT

## 2022-12-17 PROCEDURE — 84460 ALANINE AMINO (ALT) (SGPT): CPT

## 2022-12-28 ENCOUNTER — OFFICE VISIT (OUTPATIENT)
Dept: RHEUMATOLOGY | Facility: CLINIC | Age: 82
End: 2022-12-28
Payer: MEDICARE

## 2022-12-28 VITALS
DIASTOLIC BLOOD PRESSURE: 88 MMHG | HEART RATE: 73 BPM | BODY MASS INDEX: 20.81 KG/M2 | WEIGHT: 106 LBS | HEIGHT: 60 IN | SYSTOLIC BLOOD PRESSURE: 155 MMHG

## 2022-12-28 DIAGNOSIS — M05.9 SEROPOSITIVE RHEUMATOID ARTHRITIS (HCC): ICD-10-CM

## 2022-12-28 DIAGNOSIS — Z51.81 MEDICATION MONITORING ENCOUNTER: Primary | ICD-10-CM

## 2022-12-28 PROCEDURE — 1126F AMNT PAIN NOTED NONE PRSNT: CPT | Performed by: INTERNAL MEDICINE

## 2022-12-28 PROCEDURE — 99214 OFFICE O/P EST MOD 30 MIN: CPT | Performed by: INTERNAL MEDICINE

## 2022-12-28 NOTE — PATIENT INSTRUCTIONS
You were seen today for rheumatoid arthritis  Continue methotrexate 5 pills weekly and folic acid every day  Your rheumatoid arthritis is controlled  Blood work every 3 months  See me in 6 months, June

## 2023-03-08 RX ORDER — HYDROCHLOROTHIAZIDE 12.5 MG/1
12.5 CAPSULE, GELATIN COATED ORAL DAILY
Qty: 90 CAPSULE | Refills: 1 | Status: SHIPPED
Start: 2023-03-08

## 2023-03-08 RX ORDER — METOPROLOL SUCCINATE 50 MG/1
TABLET, EXTENDED RELEASE ORAL
Qty: 90 TABLET | Refills: 1 | Status: SHIPPED
Start: 2023-03-08

## 2023-03-08 NOTE — TELEPHONE ENCOUNTER
Please review. Protocol Failed or has No Protocol. Requested Prescriptions   Pending Prescriptions Disp Refills    HYDROCHLOROTHIAZIDE 12.5 MG Oral Cap [Pharmacy Med Name: HYDROCHLOROTHIAZIDE 12.5MG CAPSULES] 90 capsule 1     Sig: TAKE 1 CAPSULE(12.5 MG) BY MOUTH DAILY       Hypertensive Medications Protocol Failed - 3/7/2023  6:03 AM        Failed - Last BP reading less than 140/90     BP Readings from Last 1 Encounters:  12/28/22 : 155/88              Failed - CMP or BMP in past 6 months     No results found for this or any previous visit (from the past 4392 hour(s)).             Failed - In person appointment or virtual visit in the past 6 months     Recent Outpatient Visits              2 months ago Medication monitoring encounter    [address], [address], Mira Tatum MD    Office Visit    11 months ago Seropositive rheumatoid arthritis [facility])    [address] [facility], Maryam Hester MD    Office Visit    1 year ago Primary hypertension    [address] [facility], Riana Goyal MD    Office Visit    1 year ago Inferior pubic ramus fracture, right, with routine healing, subsequent encounter    [address] [facility], Deni Leon MD    Office Visit    1 year ago Syncope, unspecified syncope type    [address] [facility], Riana Goyal MD    Office Visit          Future Appointments         Provider Department Appt Notes    In 2 weeks Sherly Valadez MD [address], [address], [place] Follow up [address] [place] [address] **               Passed - In person appointment in the past 12 or next 3 months     Recent Outpatient Visits              2 months ago Medication monitoring encounter    [address], [address], Maryam Hester MD    Office Visit    11 months ago Seropositive rheumatoid arthritis ([facility] [address], Fátima Hester MD    Office Visit    1 year ago Primary hypertension    [address]    Office Visit    1 year ago Inferior pubic ramus fracture, right, with routine healing, subsequent encounter    [facility] [place] [place] [address], Stacie Arteaga MD    Office Visit    1 year ago Syncope, unspecified syncope type    [facility] [facility] [place] [address] [place]    Office Visit          Future Appointments         Provider Department Appt Notes    In 2 weeks Tina Bamberger, MD [facility], [address], [place] Follow up  [address] [facility] **               Passed - EGFRCR or GFRNAA > 50     GFR Evaluation  EGFRCR: 79 , resulted on 12/17/2022            METOPROLOL SUCCINATE ER 50 MG Oral Tablet 24 Hr [Pharmacy Med Name: METOPROLOL ER SUCCINATE 50MG TABS] 90 tablet 1     Sig: TAKE 1 TABLET(50 MG) BY MOUTH DAILY       Hypertensive Medications Protocol Failed - 3/7/2023  6:03 AM        Failed - Last BP reading less than 140/90     BP Readings from Last 1 Encounters:  12/28/22 : 155/88              Failed - CMP or BMP in past 6 months     No results found for this or any previous visit (from the past 4392 hour(s)).             Failed - In person appointment or virtual visit in the past 6 months     Recent Outpatient Visits              2 months ago Medication monitoring encounter    [employer] [facility], [address], Phil Mak MD    Office Visit    11 months ago Seropositive rheumatoid arthritis [facility])    [address], Fátima Hester MD    Office Visit    1 year ago Primary hypertension    [address]    Office Visit    1 year ago Inferior pubic ramus fracture, right, with routine healing, subsequent encounter Juan Manuel Coombs MD    Office Visit    1 year ago Syncope, unspecified syncope type    Pat Coombs MD    Office Visit          Future Appointments         Provider Department Appt Notes    In 2 weeks MD Alexa Back, [address], [place] Follow up  [address] [place] [address] **               Passed - In person appointment in the past 12 or next 3 months     Recent Outpatient Visits              2 months ago Medication monitoring encounter    Alexa Ramirez, [address], Sybil Neal MD    Office Visit    11 months ago Seropositive rheumatoid arthritis [facility])    Sybil Coombs MD    Office Visit    1 year ago Primary hypertension    Pat Coombs MD    Office Visit    1 year ago Inferior pubic ramus fracture, right, with routine healing, subsequent encounter    Alexa Ramirez, [address], Juan Manuel Smith MD    Office Visit    1 year ago Syncope, unspecified syncope type    Pat Coombs MD    Office Visit          Future Appointments         Provider Department Appt Notes    In 2 weeks Rivka Brothers MD [facility], [address], [place] Follow upDUE FOR  MEDICARE [address]                Passed - [facility] or GFRNAA > 50     GFR Evaluation  EGFRCR: 79 , resulted on 12/17/2022               Future Appointments         Provider Department Appt Notes    In 2 weeks MD Alexa Back, [address], [place] Follow upDUE FOR  MEDICARE [address]             Recent Outpatient Visits              2 months ago Medication monitoring encounter    Alexa Ramirez, [address], Sybil Neal MD    Office Visit    11 months ago Seropositive rheumatoid arthritis [facility])    Mal[address] E[address]anks [address], Arlice Sprung, MD    Office Visit    1 year ago Primary hypertension    Janeth Eubanks Asp, MD    Office Visit    1 year ago Inferior pubic ramus fracture, right, with routine healing, subsequent encounter    America Eubanks Asp, MD    Office Visit    1 year ago Syncope, unspecified syncope type    Janeth Eubanks Asp, MD    Office Visit

## 2023-06-05 ENCOUNTER — OFFICE VISIT (OUTPATIENT)
Dept: INTERNAL MEDICINE CLINIC | Facility: CLINIC | Age: 83
End: 2023-06-05

## 2023-06-05 VITALS
HEART RATE: 80 BPM | TEMPERATURE: 98 F | HEIGHT: 60 IN | WEIGHT: 104 LBS | BODY MASS INDEX: 20.42 KG/M2 | SYSTOLIC BLOOD PRESSURE: 138 MMHG | OXYGEN SATURATION: 99 % | DIASTOLIC BLOOD PRESSURE: 82 MMHG

## 2023-06-05 DIAGNOSIS — Z00.00 ANNUAL PHYSICAL EXAM: Primary | ICD-10-CM

## 2023-06-05 DIAGNOSIS — N39.0 URINARY TRACT INFECTION WITHOUT HEMATURIA, SITE UNSPECIFIED: ICD-10-CM

## 2023-06-05 DIAGNOSIS — Z00.00 ENCOUNTER FOR ANNUAL HEALTH EXAMINATION: ICD-10-CM

## 2023-06-05 DIAGNOSIS — N81.4 UTERINE PROLAPSE: ICD-10-CM

## 2023-06-05 DIAGNOSIS — Z78.0 MENOPAUSE: ICD-10-CM

## 2023-06-05 LAB
BASOPHILS # BLD AUTO: 0.03 X10(3) UL (ref 0–0.2)
BASOPHILS NFR BLD AUTO: 0.5 %
DEPRECATED RDW RBC AUTO: 47.9 FL (ref 35.1–46.3)
EOSINOPHIL # BLD AUTO: 0.09 X10(3) UL (ref 0–0.7)
EOSINOPHIL NFR BLD AUTO: 1.4 %
ERYTHROCYTE [DISTWIDTH] IN BLOOD BY AUTOMATED COUNT: 14 % (ref 11–15)
GLUCOSE (URINE DIPSTICK): NEGATIVE MG/DL
HCT VFR BLD AUTO: 39.5 %
HGB BLD-MCNC: 12.9 G/DL
IMM GRANULOCYTES # BLD AUTO: 0.02 X10(3) UL (ref 0–1)
IMM GRANULOCYTES NFR BLD: 0.3 %
LYMPHOCYTES # BLD AUTO: 0.52 X10(3) UL (ref 1–4)
LYMPHOCYTES NFR BLD AUTO: 8.1 %
MCH RBC QN AUTO: 31.9 PG (ref 26–34)
MCHC RBC AUTO-ENTMCNC: 32.7 G/DL (ref 31–37)
MCV RBC AUTO: 97.8 FL
MONOCYTES # BLD AUTO: 0.45 X10(3) UL (ref 0.1–1)
MONOCYTES NFR BLD AUTO: 7 %
MULTISTIX LOT#: ABNORMAL NUMERIC
NEUTROPHILS # BLD AUTO: 5.31 X10 (3) UL (ref 1.5–7.7)
NEUTROPHILS # BLD AUTO: 5.31 X10(3) UL (ref 1.5–7.7)
NEUTROPHILS NFR BLD AUTO: 82.7 %
NITRITE, URINE: NEGATIVE
PH, URINE: 6.5 (ref 4.5–8)
PLATELET # BLD AUTO: 178 10(3)UL (ref 150–450)
PROTEIN (URINE DIPSTICK): >=300 MG/DL
RBC # BLD AUTO: 4.04 X10(6)UL
SPECIFIC GRAVITY: 1.03 (ref 1–1.03)
URINE-COLOR: YELLOW
UROBILINOGEN,SEMI-QN: 1 MG/DL (ref 0–1.9)
WBC # BLD AUTO: 6.4 X10(3) UL (ref 4–11)

## 2023-06-05 PROCEDURE — 36415 COLL VENOUS BLD VENIPUNCTURE: CPT | Performed by: INTERNAL MEDICINE

## 2023-06-05 PROCEDURE — 1126F AMNT PAIN NOTED NONE PRSNT: CPT | Performed by: INTERNAL MEDICINE

## 2023-06-05 RX ORDER — HYDROCHLOROTHIAZIDE 12.5 MG/1
12.5 CAPSULE, GELATIN COATED ORAL DAILY
Qty: 90 CAPSULE | Refills: 1 | Status: SHIPPED | OUTPATIENT
Start: 2023-06-05

## 2023-06-05 RX ORDER — FOLIC ACID 1 MG/1
1 TABLET ORAL DAILY
Qty: 90 TABLET | Refills: 1 | Status: SHIPPED | OUTPATIENT
Start: 2023-06-05

## 2023-06-05 RX ORDER — METOPROLOL SUCCINATE 50 MG/1
50 TABLET, EXTENDED RELEASE ORAL DAILY
Qty: 90 TABLET | Refills: 1 | Status: SHIPPED | OUTPATIENT
Start: 2023-06-05

## 2023-06-06 LAB
ALBUMIN SERPL-MCNC: 3.8 G/DL (ref 3.4–5)
ALBUMIN/GLOB SERPL: 1 {RATIO} (ref 1–2)
ALP LIVER SERPL-CCNC: 70 U/L
ALT SERPL-CCNC: 24 U/L
ANION GAP SERPL CALC-SCNC: 3 MMOL/L (ref 0–18)
AST SERPL-CCNC: 30 U/L (ref 15–37)
BILIRUB SERPL-MCNC: 0.5 MG/DL (ref 0.1–2)
BUN BLD-MCNC: 18 MG/DL (ref 7–18)
BUN/CREAT SERPL: 22.8 (ref 10–20)
CALCIUM BLD-MCNC: 9.2 MG/DL (ref 8.5–10.1)
CHLORIDE SERPL-SCNC: 105 MMOL/L (ref 98–112)
CHOLEST SERPL-MCNC: 147 MG/DL (ref ?–200)
CO2 SERPL-SCNC: 29 MMOL/L (ref 21–32)
CREAT BLD-MCNC: 0.79 MG/DL
FASTING PATIENT LIPID ANSWER: NO
FASTING STATUS PATIENT QL REPORTED: NO
GFR SERPLBLD BASED ON 1.73 SQ M-ARVRAT: 76 ML/MIN/1.73M2 (ref 60–?)
GLOBULIN PLAS-MCNC: 3.7 G/DL (ref 2.8–4.4)
GLUCOSE BLD-MCNC: 94 MG/DL (ref 70–99)
HDLC SERPL-MCNC: 65 MG/DL (ref 40–59)
LDLC SERPL CALC-MCNC: 64 MG/DL (ref ?–100)
NONHDLC SERPL-MCNC: 82 MG/DL (ref ?–130)
OSMOLALITY SERPL CALC.SUM OF ELEC: 286 MOSM/KG (ref 275–295)
POTASSIUM SERPL-SCNC: 3.7 MMOL/L (ref 3.5–5.1)
PROT SERPL-MCNC: 7.5 G/DL (ref 6.4–8.2)
SODIUM SERPL-SCNC: 137 MMOL/L (ref 136–145)
TRIGL SERPL-MCNC: 98 MG/DL (ref 30–149)
TSI SER-ACNC: 1.82 MIU/ML (ref 0.36–3.74)
VLDLC SERPL CALC-MCNC: 15 MG/DL (ref 0–30)

## 2023-08-03 ENCOUNTER — OFFICE VISIT (OUTPATIENT)
Dept: OBGYN CLINIC | Facility: CLINIC | Age: 83
End: 2023-08-03

## 2023-08-03 VITALS
DIASTOLIC BLOOD PRESSURE: 79 MMHG | BODY MASS INDEX: 20 KG/M2 | SYSTOLIC BLOOD PRESSURE: 139 MMHG | WEIGHT: 103 LBS | HEART RATE: 97 BPM

## 2023-08-03 DIAGNOSIS — R32 URINARY INCONTINENCE, UNSPECIFIED TYPE: Primary | ICD-10-CM

## 2023-08-03 PROCEDURE — 99202 OFFICE O/P NEW SF 15 MIN: CPT | Performed by: OBSTETRICS & GYNECOLOGY

## 2023-11-01 ENCOUNTER — OFFICE VISIT (OUTPATIENT)
Dept: RHEUMATOLOGY | Facility: CLINIC | Age: 83
End: 2023-11-01

## 2023-11-01 VITALS
WEIGHT: 101 LBS | SYSTOLIC BLOOD PRESSURE: 157 MMHG | HEIGHT: 60 IN | BODY MASS INDEX: 19.83 KG/M2 | DIASTOLIC BLOOD PRESSURE: 84 MMHG | HEART RATE: 79 BPM

## 2023-11-01 DIAGNOSIS — Z51.81 MEDICATION MONITORING ENCOUNTER: Primary | ICD-10-CM

## 2023-11-01 DIAGNOSIS — M05.9 SEROPOSITIVE RHEUMATOID ARTHRITIS (HCC): ICD-10-CM

## 2023-11-01 PROCEDURE — 1126F AMNT PAIN NOTED NONE PRSNT: CPT | Performed by: INTERNAL MEDICINE

## 2023-11-01 PROCEDURE — 99214 OFFICE O/P EST MOD 30 MIN: CPT | Performed by: INTERNAL MEDICINE

## 2023-11-01 RX ORDER — FOLIC ACID 1 MG/1
1 TABLET ORAL DAILY
Qty: 90 TABLET | Refills: 1 | Status: SHIPPED | OUTPATIENT
Start: 2023-11-01

## 2023-11-01 RX ORDER — METHOTREXATE 2.5 MG/1
12.5 TABLET ORAL WEEKLY
Qty: 60 TABLET | Refills: 1 | Status: SHIPPED | OUTPATIENT
Start: 2023-11-01

## 2023-11-01 NOTE — PATIENT INSTRUCTIONS
You were seen for RA  Continue methotrexate 5 pills weekly  Folic acid daily  Blood work this week  See me in 6 mos

## 2023-11-01 NOTE — PROGRESS NOTES
Eric Ulrich is a 82year old female. HPI:   Patient presents with:  Rheumatoid Arthritis      I had the pleasure of seeing Eric Ulrich on 11/1/2023 for follow up Seropositive RA. Current Medications:  Methotrexate 5 pills weekly- on for 20 years  Advil 3 in AM and 2 in PM  Blood work:  , CCP>340    Interval History: This is a 69 yo F with hx of HTN presents to establish care for RA. She was diagnosed with rheumatoid arthritis approximately 35 years ago at the age of 38. At that time she presented with joint pain and swelling in her hands. Her RA mostly affects her hands. 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 Eyes any other joint pain or swelling. Denies any morning stiffness. She was formally seen by Dr. Fina Baig at [facility] but was too far for her. Denies any rashes, alopecia, oral ulcers, DVT or PE, RP, serositis, chest pain, shortness of breath, photosensitivity. 3/1/2021  Presents for follow-up of RA. Last seen in July 2019  Remains on Methotrexate 5 pills weekly and FA daily  Also takes Advil 3 pills in am and 2 pills in pm, due to some stiffness in hands and hard to   States her joint pain is not bad, tolerable  No stiffness in AM    3/28/2022:  Presents for follow-up of RA. Last seen in March 2021  On Methotrexate 5 pills weekly and FA daily   Blood work today shows normal CBC, creatinine, LFTs and inflammation markers  Joints are doing well, no pain or swelling. Hard to make a fist but chronic   No pain or stiffness in AM.   Continues to take advil 3 in AM and 2 in PM    12/28/2022:  Presents for follow-up of RA. Last seen in March 2021  On Methotrexate 5 pills weekly and FA daily   Recent blood work shows normal kidney and liver tests  No joint pain or swelling, RA is controlled     11/1/2023:  Presents for follow-up of RA.   On Methotrexate 5 pills weekly and FA daily   No joint pain or swelling, RA is controlled   No rash or psoriasis  No s/e from medications        HISTORY:  Past Medical History:   Diagnosis Date    Rheumatoid arthritis ([facility] [address] [address]     Unspecified essential hypertension     Uterine prolapse       Social Hx Reviewed   Family Hx Reviewed     Medications (Active prior to today's visit):  Current Outpatient Medications   Medication Sig Dispense Refill    methotrexate 2.5 MG Oral Tab Take 5 tablets (12.5 mg total) by mouth once a week. 60 tablet 0    hydroCHLOROthiazide 12.5 MG Oral Cap Take 1 capsule (12.5 mg total) by mouth daily. 90 capsule 1    metoprolol succinate ER 50 MG Oral Tablet 24 Hr Take 1 tablet (50 mg total) by mouth daily. 90 tablet 1    folic acid 1 MG Oral Tab Take 1 tablet (1 mg total) by mouth daily. 90 tablet 1    cholecalciferol 25 MCG (1000 UT) Oral Cap Take  by mouth. .cmed  Allergies:  No Known Allergies      ROS:   All other ROS are negative. PHYSICAL EXAM:   GEN: AAOx3, NAD  HEENT: EOMI, PERRLA, no injection or icterus, oral mucosa moist, no oral lesions. No lymphadenopathy. No facial rash  CVS: RRR, no murmurs rubs or gallops. Equal 2+ distal pulses. LUNGS: CTAB, no increased work of breathing  ABDOMEN:  soft NT/ND, +BS, no HSM  SKIN: No rashes or skin lesions. 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 No pain with ROM. Ankles: FROM, no pain or swelling or warmth on palpation  Feet: no pain with MTP squeeze, no toe swelling or pain or warmth on palpation with FROM  Spine: no lumbar or sacral pain on palpation. NEURO: Cranial nerves II-XII intact grossly.  5/5 strength throughout in both upper and lower extremities, sensation intact. 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 States that her symptoms are stable with no stiffness or swelling  - At times she will take Advil 3 pills in the morning and 2 pills at night due to some stiffness and bone pain. Last visit advised to stop the Advil and take Tylenol arthritis but she state that the Tylenol does not work. Recent kidney and liver test were normal  - Recommended to continue methotrexate 5 pills weekly and folic acid daily.  - Blood work reviewed with patient today and normal.  Continue to get blood work every 3 to 4 months.     Pt will f/u in 6 mos      Elo Marcelino MD  11/1/2023   6:40 PM

## 2023-12-13 RX ORDER — METOPROLOL SUCCINATE 50 MG/1
50 TABLET, EXTENDED RELEASE ORAL DAILY
Qty: 90 TABLET | Refills: 0 | Status: SHIPPED | OUTPATIENT
Start: 2023-12-13

## 2023-12-13 RX ORDER — HYDROCHLOROTHIAZIDE 12.5 MG/1
12.5 CAPSULE, GELATIN COATED ORAL DAILY
Qty: 90 CAPSULE | Refills: 0 | Status: SHIPPED | OUTPATIENT
Start: 2023-12-13

## 2023-12-13 NOTE — TELEPHONE ENCOUNTER
Please review. Protocol failed / No Protocol. Requested Prescriptions   Pending Prescriptions Disp Refills    METOPROLOL SUCCINATE ER 50 MG Oral Tablet 24 Hr [Pharmacy Med Name: METOPROLOL ER SUCCINATE 50MG TABS] 90 tablet 1     Sig: TAKE 1 TABLET(50 MG) BY MOUTH DAILY       Hypertensive Medications Protocol Failed - 12/11/2023 12:35 PM        Failed - Last BP reading less than 140/90     BP Readings from Last 1 Encounters:   11/01/23 157/84               Failed - CMP or BMP in past 6 months     No results found for this or any previous visit (from the past 4392 hour(s)).             Failed - In person appointment or virtual visit in the past 6 months     Recent Outpatient Visits              1 month ago Medication monitoring encounter    Shanon العلي MD    Office Visit    4 months ago Urinary incontinence, unspecified type    [facility], [address], [address] [place] [address] Nathanael Ramirez MD    Office Visit    6 months ago Annual physical exam    [address] [place], [place] [address]Iker MD    Office Visit    11 months ago Medication monitoring encounter    [address] [place], [place] [address]Malcom Deedra Gutting, MD    Office Visit    1 year ago Seropositive rheumatoid arthritis [facility])    [address] [facility]    Office Visit                      Passed - In person appointment in the past 12 or next 3 months     Recent Outpatient Visits              1 month ago Medication monitoring encounter    [address] [place], [place] [address]Saturnino MD    Office Visit    4 months ago Urinary incontinence, unspecified type    [facility], [address] [facility],[address], [facility] - OB/GYN Nathanael Ramirez MD    Office Visit    6 months ago Annual physical exam    [address] [place], [place] [address]Iker MD    Office Visit    11 months ago Medication monitoring encounter    Malcom Nation Christopherland, MD    Office Visit    1 year ago Seropositive rheumatoid arthritis [facility])    Phillip Jacobu, [address], Maurizio Hester MD    Office Visit                      Passed - [facility] or [facility] > 50     GFR Evaluation  EGFRCR: 76 , resulted on 6/5/2023            HYDROCHLOROTHIAZIDE 12.5 MG Oral Cap [Pharmacy Med Name: HYDROCHLOROTHIAZIDE 12.5MG CAPSULES] 90 capsule 1     Sig: TAKE 1 CAPSULE(12.5 MG) BY MOUTH DAILY       Hypertensive Medications Protocol Failed - 12/11/2023 12:35 PM        Failed - Last BP reading less than 140/90     BP Readings from Last 1 Encounters:   11/01/23 157/84               Failed - CMP or BMP in past 6 months     No results found for this or any previous visit (from the past 4392 hour(s)).             Failed - In person appointment or virtual visit in the past 6 months     Recent Outpatient Visits              1 month ago Medication monitoring encounter    Johanny Nesbitt MD    Office Visit    4 months ago Urinary incontinence, unspecified type    [facility], [address], [address] Jonathan Cervantes MD    Office Visit    6 months ago Annual physical exam    Wendy Nation MD    Office Visit    11 months ago Medication monitoring encounter    Malcom Nation Christopherland, MD    Office Visit    1 year ago Seropositive rheumatoid arthritis [facility])    Marky Nation MD    Office Visit                      Passed - In person appointment in the past 12 or next 3 months     Recent Outpatient Visits              1 month ago Medication monitoring encounter    Johanny Nesbitt MD    Office Visit    4 months ago Urinary incontinence, unspecified type    [facility], [address] Leobardo Hoffman MD    Office Visit    6 months ago Annual physical exam    [address] [facility], Abby Schofield MD    Office Visit    11 months ago Medication monitoring encounter    [address] [facility], Valdene Moritz, MD    Office Visit    1 year ago Seropositive rheumatoid arthritis [facility])    [address], [address] [facility],[address], Valdene Moritz, MD    Office Visit                      Passed - [place] or GFRNAA > 50     GFR Evaluation  EGFRCR: 76 , resulted on 6/5/2023

## 2023-12-13 NOTE — TELEPHONE ENCOUNTER
Refill passed per [id number], [facility] protocol. Requested Prescriptions   Pending Prescriptions Disp Refills    METOPROLOL SUCCINATE ER 50 MG Oral Tablet 24 Hr [Pharmacy Med Name: METOPROLOL ER SUCCINATE 50MG TABS] 90 tablet 1     Sig: TAKE 1 TABLET(50 MG) BY MOUTH DAILY       Hypertensive Medications Protocol Failed - 12/11/2023 12:35 PM        Failed - Last BP reading less than 140/90     BP Readings from Last 1 Encounters:   11/01/23 157/84               Failed - CMP or BMP in past 6 months     No results found for this or any previous visit (from the past 4392 hour(s)).             Failed - In person appointment or virtual visit in the past 6 months     Recent Outpatient Visits              1 month ago Medication monitoring encounter    Loy Mercedes MD    Office Visit    4 months ago Urinary incontinence, unspecified type    [facility], [address], [address] [place] [address] Mele Guzman MD    Office Visit    6 months ago Annual physical exam    [address] [place], [place] [address]Trudi MD    Office Visit    11 months ago Medication monitoring encounter    [address] [place], [place] [address]Malcom Gershon Bade, MD    Office Visit    1 year ago Seropositive rheumatoid arthritis [facility])    [address] [facility]    Office Visit                      Passed - In person appointment in the past 12 or next 3 months     Recent Outpatient Visits              1 month ago Medication monitoring encounter    [facility]    Office Visit    4 months ago Urinary incontinence, unspecified type    [facility], [address] [facility],[address], [facility] - OB/GYN Mele Guzman MD    Office Visit    6 months ago Annual physical exam    [address] [place] [address] Trudi Hatch MD    Office Visit    11 months ago Medication monitoring encounter    Malcom Garces Christopherland, MD    Office Visit    1 year ago Seropositive rheumatoid arthritis [facility])    Sindidaly Leonid, [address], Maurizio Hester MD    Office Visit                      Passed - [facility] or [facility] > 50     GFR Evaluation  EGFRCR: 76 , resulted on 6/5/2023            HYDROCHLOROTHIAZIDE 12.5 MG Oral Cap [Pharmacy Med Name: HYDROCHLOROTHIAZIDE 12.5MG CAPSULES] 90 capsule 1     Sig: TAKE 1 CAPSULE(12.5 MG) BY MOUTH DAILY       Hypertensive Medications Protocol Failed - 12/11/2023 12:35 PM        Failed - Last BP reading less than 140/90     BP Readings from Last 1 Encounters:   11/01/23 157/84               Failed - CMP or BMP in past 6 months     No results found for this or any previous visit (from the past 4392 hour(s)).             Failed - In person appointment or virtual visit in the past 6 months     Recent Outpatient Visits              1 month ago Medication monitoring encounter    Malina Smith MD    Office Visit    4 months ago Urinary incontinence, unspecified type    [facility], [address], [address] Rehana Lin MD    Office Visit    6 months ago Annual physical exam    Davis Garces MD    Office Visit    11 months ago Medication monitoring encounter    Malcom Garces Christopherland, MD    Office Visit    1 year ago Seropositive rheumatoid arthritis [facility])    Peyton Garces MD    Office Visit                      Passed - In person appointment in the past 12 or next 3 months     Recent Outpatient Visits              1 month ago Medication monitoring encounter    Malina Smith MD    Office Visit    4 months ago Urinary incontinence, unspecified type    [facility], [address] [facility] Leobardo Hoffman MD    Office Visit    6 months ago Annual physical exam    Abby Beavers MD    Office Visit    11 months ago Medication monitoring encounter    Daryel Aran, Valdene Moritz, MD    Office Visit    1 year ago Seropositive rheumatoid arthritis [facility])    [address], [address] [facility],[address], Valdene Moritz, MD    Office Visit                      Passed - [facility] or GFRNAA > 50     GFR Evaluation  EGFRCR: 76 , resulted on 6/5/2023

## 2023-12-13 NOTE — TELEPHONE ENCOUNTER
Please review. Protocol failed / No Protocol. Requested Prescriptions   Pending Prescriptions Disp Refills    METOPROLOL SUCCINATE ER 50 MG Oral Tablet 24 Hr [Pharmacy Med Name: METOPROLOL ER SUCCINATE 50MG TABS] 90 tablet 1     Sig: TAKE 1 TABLET(50 MG) BY MOUTH DAILY       Hypertensive Medications Protocol Failed - 12/11/2023 12:35 PM        Failed - Last BP reading less than 140/90     BP Readings from Last 1 Encounters:   11/01/23 157/84               Failed - CMP or BMP in past 6 months     No results found for this or any previous visit (from the past 4392 hour(s)).             Failed - In person appointment or virtual visit in the past 6 months     Recent Outpatient Visits              1 month ago Medication monitoring encounter    Xu Brantley MD    Office Visit    4 months ago Urinary incontinence, unspecified type    [facility], [address], [address] [facility] Kaydne Leos MD    Office Visit    6 months ago Annual physical exam    Jasvir Correa MD    Office Visit    11 months ago Medication monitoring encounter    Malcom Correa Earlene [place], MD    Office Visit    1 year ago Seropositive rheumatoid arthritis [facility])    Geraldene Oto, Candance Baba, MD    Office Visit                      Passed - In person appointment in the past 12 or next 3 months     Recent Outpatient Visits              1 month ago Medication monitoring encounter    Geraldene Oto, Candance Baba, MD    Office Visit    4 months ago Urinary incontinence, unspecified type    [facility], [address], [address] - OB/GYN Kayden Leos MD    Office Visit    6 months ago Annual physical exam    Jasvir Correa MD    Office Visit    11 months ago Medication monitoring encounter    Malcom Constantino Christopherland, MD    Office Visit    1 year ago Seropositive rheumatoid arthritis [facility])    [address], [address], Maurizio Hester MD    Office Visit                      Passed - [facility] or [facility] > 50     GFR Evaluation  EGFRCR: 76 , resulted on 6/5/2023            HYDROCHLOROTHIAZIDE 12.5 MG Oral Cap [Pharmacy Med Name: HYDROCHLOROTHIAZIDE 12.5MG CAPSULES] 90 capsule 1     Sig: TAKE 1 CAPSULE(12.5 MG) BY MOUTH DAILY       Hypertensive Medications Protocol Failed - 12/11/2023 12:35 PM        Failed - Last BP reading less than 140/90     BP Readings from Last 1 Encounters:   11/01/23 157/84               Failed - CMP or BMP in past 6 months     No results found for this or any previous visit (from the past 4392 hour(s)).             Failed - In person appointment or virtual visit in the past 6 months     Recent Outpatient Visits              1 month ago Medication monitoring encounter    Du Cook MD    Office Visit    4 months ago Urinary incontinence, unspecified type    [facility], [address], [address] [facility] Dani Baker MD    Office Visit    6 months ago Annual physical exam    Carolyn Constantino MD    Office Visit    11 months ago Medication monitoring encounter    Malcom Constantino Christopherland, MD    Office Visit    1 year ago Seropositive rheumatoid arthritis [facility])    Janis Constantino MD    Office Visit                      Passed - In person appointment in the past 12 or next 3 months     Recent Outpatient Visits              1 month ago Medication monitoring encounter    Du Cook MD    Office Visit    4 months ago Urinary incontinence, unspecified type    [facility], [address] Karyn Bolivar MD    Office Visit    6 months ago Annual physical exam    Rebecca Shirley MD    Office Visit    11 months ago Medication monitoring encounter    Lupe Shirley MD    Office Visit    1 year ago Seropositive rheumatoid arthritis [facility])    Karlene Doan, [address], Lupe Trejo MD    Office Visit                      Passed - [facility] or GFRNAA > 50     GFR Evaluation  EGFRCR: 76 , resulted on 6/5/2023

## 2024-01-01 ENCOUNTER — HOSPITAL ENCOUNTER (INPATIENT)
Facility: HOSPITAL | Age: 84
LOS: 1 days | Discharge: HOME HEALTH | DRG: 641 | End: 2024-01-01
Attending: EMERGENCY MEDICINE | Admitting: INTERNAL MEDICINE
Payer: MEDICARE

## 2024-01-01 ENCOUNTER — TELEPHONE (OUTPATIENT)
Dept: INTERNAL MEDICINE CLINIC | Facility: CLINIC | Age: 84
End: 2024-01-01

## 2024-01-01 VITALS
HEART RATE: 78 BPM | RESPIRATION RATE: 16 BRPM | OXYGEN SATURATION: 92 % | WEIGHT: 98.13 LBS | DIASTOLIC BLOOD PRESSURE: 83 MMHG | SYSTOLIC BLOOD PRESSURE: 109 MMHG | BODY MASS INDEX: 20.6 KG/M2 | HEIGHT: 58 IN | TEMPERATURE: 100 F

## 2024-01-01 DIAGNOSIS — N17.9 AKI (ACUTE KIDNEY INJURY): ICD-10-CM

## 2024-01-01 DIAGNOSIS — E87.6 HYPOKALEMIA: ICD-10-CM

## 2024-01-01 DIAGNOSIS — E87.1 HYPONATREMIA: Primary | ICD-10-CM

## 2024-01-01 LAB
ALBUMIN SERPL-MCNC: 3 G/DL (ref 3.4–5)
ALBUMIN/GLOB SERPL: 0.8 {RATIO} (ref 1–2)
ALP LIVER SERPL-CCNC: 75 U/L
ALT SERPL-CCNC: 22 U/L
ANION GAP SERPL CALC-SCNC: 5 MMOL/L (ref 0–18)
ANION GAP SERPL CALC-SCNC: 6 MMOL/L (ref 0–18)
ANION GAP SERPL CALC-SCNC: 6 MMOL/L (ref 0–18)
ANION GAP SERPL CALC-SCNC: 7 MMOL/L (ref 0–18)
ANION GAP SERPL CALC-SCNC: 9 MMOL/L (ref 0–18)
AST SERPL-CCNC: 25 U/L (ref 15–37)
ATRIAL RATE: 66 BPM
BASOPHILS # BLD AUTO: 0.01 X10(3) UL (ref 0–0.2)
BASOPHILS # BLD AUTO: 0.02 X10(3) UL (ref 0–0.2)
BASOPHILS NFR BLD AUTO: 0.2 %
BASOPHILS NFR BLD AUTO: 0.3 %
BILIRUB SERPL-MCNC: 0.6 MG/DL (ref 0.1–2)
BILIRUB UR QL STRIP.AUTO: NEGATIVE
BUN BLD-MCNC: 12 MG/DL (ref 9–23)
BUN BLD-MCNC: 14 MG/DL (ref 9–23)
BUN BLD-MCNC: 17 MG/DL (ref 9–23)
BUN BLD-MCNC: 18 MG/DL (ref 9–23)
BUN BLD-MCNC: 26 MG/DL (ref 9–23)
CALCIUM BLD-MCNC: 8 MG/DL (ref 8.7–10.4)
CALCIUM BLD-MCNC: 8.5 MG/DL (ref 8.7–10.4)
CALCIUM BLD-MCNC: 8.5 MG/DL (ref 8.7–10.4)
CALCIUM BLD-MCNC: 8.7 MG/DL (ref 8.7–10.4)
CALCIUM BLD-MCNC: 9.3 MG/DL (ref 8.5–10.1)
CHLORIDE SERPL-SCNC: 82 MMOL/L (ref 98–112)
CHLORIDE SERPL-SCNC: 92 MMOL/L (ref 98–112)
CHLORIDE SERPL-SCNC: 92 MMOL/L (ref 98–112)
CHLORIDE SERPL-SCNC: 93 MMOL/L (ref 98–112)
CHLORIDE SERPL-SCNC: 94 MMOL/L (ref 98–112)
CO2 SERPL-SCNC: 27 MMOL/L (ref 21–32)
CO2 SERPL-SCNC: 28 MMOL/L (ref 21–32)
CO2 SERPL-SCNC: 29 MMOL/L (ref 21–32)
CO2 SERPL-SCNC: 30 MMOL/L (ref 21–32)
CO2 SERPL-SCNC: 33 MMOL/L (ref 21–32)
CREAT BLD-MCNC: 0.76 MG/DL
CREAT BLD-MCNC: 0.81 MG/DL
CREAT BLD-MCNC: 0.88 MG/DL
CREAT BLD-MCNC: 1.11 MG/DL
CREAT BLD-MCNC: 1.68 MG/DL
EGFRCR SERPLBLD CKD-EPI 2021: 30 ML/MIN/1.73M2 (ref 60–?)
EGFRCR SERPLBLD CKD-EPI 2021: 50 ML/MIN/1.73M2 (ref 60–?)
EGFRCR SERPLBLD CKD-EPI 2021: 66 ML/MIN/1.73M2 (ref 60–?)
EGFRCR SERPLBLD CKD-EPI 2021: 72 ML/MIN/1.73M2 (ref 60–?)
EGFRCR SERPLBLD CKD-EPI 2021: 78 ML/MIN/1.73M2 (ref 60–?)
EOSINOPHIL # BLD AUTO: 0 X10(3) UL (ref 0–0.7)
EOSINOPHIL # BLD AUTO: 0.03 X10(3) UL (ref 0–0.7)
EOSINOPHIL NFR BLD AUTO: 0 %
EOSINOPHIL NFR BLD AUTO: 0.5 %
ERYTHROCYTE [DISTWIDTH] IN BLOOD BY AUTOMATED COUNT: 14.7 %
ERYTHROCYTE [DISTWIDTH] IN BLOOD BY AUTOMATED COUNT: 14.8 %
GLOBULIN PLAS-MCNC: 3.8 G/DL (ref 2.8–4.4)
GLUCOSE BLD-MCNC: 130 MG/DL (ref 70–99)
GLUCOSE BLD-MCNC: 75 MG/DL (ref 70–99)
GLUCOSE BLD-MCNC: 89 MG/DL (ref 70–99)
GLUCOSE BLD-MCNC: 95 MG/DL (ref 70–99)
GLUCOSE BLD-MCNC: 96 MG/DL (ref 70–99)
GLUCOSE UR STRIP.AUTO-MCNC: NORMAL MG/DL
HCT VFR BLD AUTO: 29.5 %
HCT VFR BLD AUTO: 29.9 %
HGB BLD-MCNC: 10.4 G/DL
HGB BLD-MCNC: 10.8 G/DL
IMM GRANULOCYTES # BLD AUTO: 0.05 X10(3) UL (ref 0–1)
IMM GRANULOCYTES # BLD AUTO: 0.07 X10(3) UL (ref 0–1)
IMM GRANULOCYTES NFR BLD: 1.1 %
IMM GRANULOCYTES NFR BLD: 1.2 %
LEUKOCYTE ESTERASE UR QL STRIP.AUTO: 500
LYMPHOCYTES # BLD AUTO: 0.16 X10(3) UL (ref 1–4)
LYMPHOCYTES # BLD AUTO: 0.3 X10(3) UL (ref 1–4)
LYMPHOCYTES NFR BLD AUTO: 3.6 %
LYMPHOCYTES NFR BLD AUTO: 5.1 %
MAGNESIUM SERPL-MCNC: 1.5 MG/DL (ref 1.6–2.6)
MAGNESIUM SERPL-MCNC: 1.6 MG/DL (ref 1.6–2.6)
MAGNESIUM SERPL-MCNC: 2.2 MG/DL (ref 1.6–2.6)
MCH RBC QN AUTO: 32.1 PG (ref 26–34)
MCH RBC QN AUTO: 32.4 PG (ref 26–34)
MCHC RBC AUTO-ENTMCNC: 35.3 G/DL (ref 31–37)
MCHC RBC AUTO-ENTMCNC: 36.1 G/DL (ref 31–37)
MCV RBC AUTO: 89.8 FL
MCV RBC AUTO: 91 FL
MONOCYTES # BLD AUTO: 0.15 X10(3) UL (ref 0.1–1)
MONOCYTES # BLD AUTO: 0.18 X10(3) UL (ref 0.1–1)
MONOCYTES NFR BLD AUTO: 2.5 %
MONOCYTES NFR BLD AUTO: 4 %
NEUTROPHILS # BLD AUTO: 4.05 X10 (3) UL (ref 1.5–7.7)
NEUTROPHILS # BLD AUTO: 4.05 X10(3) UL (ref 1.5–7.7)
NEUTROPHILS # BLD AUTO: 5.35 X10 (3) UL (ref 1.5–7.7)
NEUTROPHILS # BLD AUTO: 5.35 X10(3) UL (ref 1.5–7.7)
NEUTROPHILS NFR BLD AUTO: 90.4 %
NEUTROPHILS NFR BLD AUTO: 91.1 %
OSMOLALITY SERPL CALC.SUM OF ELEC: 261 MOSM/KG (ref 275–295)
OSMOLALITY SERPL CALC.SUM OF ELEC: 264 MOSM/KG (ref 275–295)
OSMOLALITY SERPL CALC.SUM OF ELEC: 265 MOSM/KG (ref 275–295)
OSMOLALITY SERPL CALC.SUM OF ELEC: 266 MOSM/KG (ref 275–295)
OSMOLALITY SERPL CALC.SUM OF ELEC: 268 MOSM/KG (ref 275–295)
P AXIS: 7 DEGREES
P-R INTERVAL: 172 MS
PH UR STRIP.AUTO: 5.5 [PH] (ref 5–8)
PHOSPHATE SERPL-MCNC: 2 MG/DL (ref 2.4–5.1)
PLATELET # BLD AUTO: 187 10(3)UL (ref 150–450)
PLATELET # BLD AUTO: 201 10(3)UL (ref 150–450)
POTASSIUM SERPL-SCNC: 2.2 MMOL/L (ref 3.5–5.1)
POTASSIUM SERPL-SCNC: 3 MMOL/L (ref 3.5–5.1)
POTASSIUM SERPL-SCNC: 3 MMOL/L (ref 3.5–5.1)
POTASSIUM SERPL-SCNC: 3.1 MMOL/L (ref 3.5–5.1)
POTASSIUM SERPL-SCNC: 3.2 MMOL/L (ref 3.5–5.1)
POTASSIUM SERPL-SCNC: 3.4 MMOL/L (ref 3.5–5.1)
PROT SERPL-MCNC: 6.8 G/DL (ref 6.4–8.2)
PROT UR STRIP.AUTO-MCNC: 30 MG/DL
Q-T INTERVAL: 434 MS
QRS DURATION: 98 MS
QTC CALCULATION (BEZET): 454 MS
R AXIS: -19 DEGREES
RBC # BLD AUTO: 3.24 X10(6)UL
RBC # BLD AUTO: 3.33 X10(6)UL
RBC #/AREA URNS AUTO: >10 /HPF
SODIUM SERPL-SCNC: 122 MMOL/L (ref 136–145)
SODIUM SERPL-SCNC: 127 MMOL/L (ref 136–145)
SODIUM SERPL-SCNC: 127 MMOL/L (ref 136–145)
SODIUM SERPL-SCNC: 128 MMOL/L (ref 136–145)
SODIUM SERPL-SCNC: 129 MMOL/L (ref 136–145)
SP GR UR STRIP.AUTO: 1.01 (ref 1–1.03)
T AXIS: -7 DEGREES
UROBILINOGEN UR STRIP.AUTO-MCNC: NORMAL MG/DL
VENTRICULAR RATE: 66 BPM
WBC # BLD AUTO: 4.5 X10(3) UL (ref 4–11)
WBC # BLD AUTO: 5.9 X10(3) UL (ref 4–11)
WBC #/AREA URNS AUTO: >50 /HPF
WBC CLUMPS UR QL AUTO: PRESENT /HPF

## 2024-01-01 PROCEDURE — 99239 HOSP IP/OBS DSCHRG MGMT >30: CPT | Performed by: INTERNAL MEDICINE

## 2024-01-01 PROCEDURE — 99223 1ST HOSP IP/OBS HIGH 75: CPT | Performed by: STUDENT IN AN ORGANIZED HEALTH CARE EDUCATION/TRAINING PROGRAM

## 2024-01-01 RX ORDER — HEPARIN SODIUM 5000 [USP'U]/ML
5000 INJECTION, SOLUTION INTRAVENOUS; SUBCUTANEOUS EVERY 8 HOURS SCHEDULED
Status: DISCONTINUED | OUTPATIENT
Start: 2024-01-01 | End: 2024-01-01

## 2024-01-01 RX ORDER — CHOLECALCIFEROL (VITAMIN D3) 25 MCG
1000 TABLET ORAL DAILY
Status: DISCONTINUED | OUTPATIENT
Start: 2024-01-01 | End: 2024-01-01

## 2024-01-01 RX ORDER — MAGNESIUM OXIDE 400 MG/1
400 TABLET ORAL ONCE
Status: COMPLETED | OUTPATIENT
Start: 2024-01-01 | End: 2024-01-01

## 2024-01-01 RX ORDER — CEPHALEXIN 500 MG/1
500 CAPSULE ORAL 3 TIMES DAILY
Qty: 15 CAPSULE | Refills: 0 | Status: SHIPPED | OUTPATIENT
Start: 2024-01-01 | End: 2024-01-01

## 2024-01-01 RX ORDER — SODIUM CHLORIDE, SODIUM LACTATE, POTASSIUM CHLORIDE, CALCIUM CHLORIDE 600; 310; 30; 20 MG/100ML; MG/100ML; MG/100ML; MG/100ML
INJECTION, SOLUTION INTRAVENOUS CONTINUOUS
Status: DISCONTINUED | OUTPATIENT
Start: 2024-01-01 | End: 2024-01-01

## 2024-01-01 RX ORDER — POTASSIUM CHLORIDE 1500 MG/1
40 TABLET, EXTENDED RELEASE ORAL ONCE
Status: COMPLETED | OUTPATIENT
Start: 2024-01-01 | End: 2024-01-01

## 2024-01-01 RX ORDER — SENNOSIDES 8.6 MG
17.2 TABLET ORAL NIGHTLY PRN
Status: DISCONTINUED | OUTPATIENT
Start: 2024-01-01 | End: 2024-01-01

## 2024-01-01 RX ORDER — MAGNESIUM SULFATE HEPTAHYDRATE 40 MG/ML
2 INJECTION, SOLUTION INTRAVENOUS ONCE
Status: COMPLETED | OUTPATIENT
Start: 2024-01-01 | End: 2024-01-01

## 2024-01-01 RX ORDER — SULFAMETHOXAZOLE AND TRIMETHOPRIM 800; 160 MG/1; MG/1
1 TABLET ORAL EVERY 12 HOURS SCHEDULED
Qty: 6 TABLET | Refills: 0 | Status: SHIPPED | OUTPATIENT
Start: 2024-01-01 | End: 2024-01-01

## 2024-01-01 RX ORDER — BENZONATATE 200 MG/1
200 CAPSULE ORAL 3 TIMES DAILY PRN
Status: DISCONTINUED | OUTPATIENT
Start: 2024-01-01 | End: 2024-01-01

## 2024-01-01 RX ORDER — POTASSIUM CHLORIDE 1.5 G/1.58G
40 POWDER, FOR SOLUTION ORAL ONCE
Status: COMPLETED | OUTPATIENT
Start: 2024-01-01 | End: 2024-01-01

## 2024-01-01 RX ORDER — GABAPENTIN 100 MG/1
100 CAPSULE ORAL 3 TIMES DAILY
Status: DISCONTINUED | OUTPATIENT
Start: 2024-01-01 | End: 2024-01-01

## 2024-01-01 RX ORDER — ECHINACEA PURPUREA EXTRACT 125 MG
1 TABLET ORAL
Status: DISCONTINUED | OUTPATIENT
Start: 2024-01-01 | End: 2024-01-01

## 2024-01-01 RX ORDER — ONDANSETRON 2 MG/ML
4 INJECTION INTRAMUSCULAR; INTRAVENOUS EVERY 6 HOURS PRN
Status: DISCONTINUED | OUTPATIENT
Start: 2024-01-01 | End: 2024-01-01

## 2024-01-01 RX ORDER — ENOXAPARIN SODIUM 100 MG/ML
30 INJECTION SUBCUTANEOUS NIGHTLY
Status: DISCONTINUED | OUTPATIENT
Start: 2024-01-01 | End: 2024-01-01

## 2024-01-01 RX ORDER — POLYETHYLENE GLYCOL 3350 17 G/17G
17 POWDER, FOR SOLUTION ORAL DAILY PRN
Status: DISCONTINUED | OUTPATIENT
Start: 2024-01-01 | End: 2024-01-01

## 2024-01-01 RX ORDER — METOPROLOL SUCCINATE 50 MG/1
50 TABLET, EXTENDED RELEASE ORAL DAILY
Status: DISCONTINUED | OUTPATIENT
Start: 2024-01-01 | End: 2024-01-01

## 2024-01-01 RX ORDER — POTASSIUM CHLORIDE 14.9 MG/ML
20 INJECTION INTRAVENOUS ONCE
Status: COMPLETED | OUTPATIENT
Start: 2024-01-01 | End: 2024-01-01

## 2024-01-01 RX ORDER — CEPHALEXIN 500 MG/1
500 CAPSULE ORAL 3 TIMES DAILY
Status: DISCONTINUED | OUTPATIENT
Start: 2024-01-01 | End: 2024-01-01

## 2024-01-01 RX ORDER — ACETAMINOPHEN 500 MG
500 TABLET ORAL EVERY 4 HOURS PRN
Status: DISCONTINUED | OUTPATIENT
Start: 2024-01-01 | End: 2024-01-01

## 2024-01-01 RX ORDER — METOCLOPRAMIDE HYDROCHLORIDE 5 MG/ML
5 INJECTION INTRAMUSCULAR; INTRAVENOUS EVERY 8 HOURS PRN
Status: DISCONTINUED | OUTPATIENT
Start: 2024-01-01 | End: 2024-01-01

## 2024-01-01 RX ORDER — SULFAMETHOXAZOLE AND TRIMETHOPRIM 800; 160 MG/1; MG/1
1 TABLET ORAL EVERY 12 HOURS SCHEDULED
Status: DISCONTINUED | OUTPATIENT
Start: 2024-01-01 | End: 2024-01-01

## 2024-01-01 RX ORDER — IBUPROFEN 200 MG
600 TABLET ORAL 2 TIMES DAILY
COMMUNITY
End: 2024-01-01

## 2024-01-01 RX ORDER — SODIUM CHLORIDE, SODIUM LACTATE, POTASSIUM CHLORIDE, CALCIUM CHLORIDE 600; 310; 30; 20 MG/100ML; MG/100ML; MG/100ML; MG/100ML
INJECTION, SOLUTION INTRAVENOUS CONTINUOUS
Status: ACTIVE | OUTPATIENT
Start: 2024-01-01 | End: 2024-01-01

## 2024-01-01 RX ORDER — BISACODYL 10 MG
10 SUPPOSITORY, RECTAL RECTAL
Status: DISCONTINUED | OUTPATIENT
Start: 2024-01-01 | End: 2024-01-01

## 2024-01-01 RX ORDER — METHOTREXATE 2.5 MG/1
12.5 TABLET ORAL
Status: DISCONTINUED | OUTPATIENT
Start: 2024-01-01 | End: 2024-01-01

## 2024-01-01 RX ORDER — ENOXAPARIN SODIUM 100 MG/ML
40 INJECTION SUBCUTANEOUS DAILY
Status: DISCONTINUED | OUTPATIENT
Start: 2024-01-01 | End: 2024-01-01

## 2024-01-01 RX ORDER — FOLIC ACID 1 MG/1
1 TABLET ORAL DAILY
Status: DISCONTINUED | OUTPATIENT
Start: 2024-01-01 | End: 2024-01-01

## 2024-01-16 ENCOUNTER — TELEPHONE (OUTPATIENT)
Dept: RHEUMATOLOGY | Facility: CLINIC | Age: 84
End: 2024-01-16

## 2024-01-16 NOTE — TELEPHONE ENCOUNTER
Last refill was 11/1/2023 for a 90 day supply with 1 refill.Patient should have refills left.My chart message send to patient

## 2024-01-16 NOTE — TELEPHONE ENCOUNTER
Current Outpatient Medications   Medication Sig Dispense Refill    folic acid 1 MG Oral Tab Take 1 tablet (1 mg total) by mouth daily. 90 tablet 1

## 2024-04-25 NOTE — TELEPHONE ENCOUNTER
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  States that her symptoms are stable with no stiffness or swelling  - At times she will take Advil 3 pills in the morning and 2 pills at night due to some stiffness and bone pain.  Last visit advised to stop the Advil and take Tylenol arthritis but she state that the Tylenol does not work.  Recent kidney and liver test were normal  - Recommended to continue methotrexate 5 pills weekly and folic acid daily.  - Blood work reviewed with patient today and normal.  Continue to get blood work every 3 to 4 months.     Pt will f/u in 6 mos       Daniela Dash MD  11/1/2023   6:40 PM  Please advise.

## 2024-04-26 RX ORDER — FOLIC ACID 1 MG/1
1 TABLET ORAL DAILY
Qty: 90 TABLET | Refills: 1 | Status: SHIPPED | OUTPATIENT
Start: 2024-04-26

## 2024-05-21 ENCOUNTER — OFFICE VISIT (OUTPATIENT)
Dept: RHEUMATOLOGY | Facility: CLINIC | Age: 84
End: 2024-05-21

## 2024-05-21 VITALS
DIASTOLIC BLOOD PRESSURE: 79 MMHG | WEIGHT: 103 LBS | SYSTOLIC BLOOD PRESSURE: 146 MMHG | HEIGHT: 60 IN | BODY MASS INDEX: 20.22 KG/M2 | HEART RATE: 75 BPM

## 2024-05-21 DIAGNOSIS — M05.9 SEROPOSITIVE RHEUMATOID ARTHRITIS (HCC): Primary | ICD-10-CM

## 2024-05-21 DIAGNOSIS — Z51.81 MEDICATION MONITORING ENCOUNTER: ICD-10-CM

## 2024-05-21 PROCEDURE — 99214 OFFICE O/P EST MOD 30 MIN: CPT | Performed by: INTERNAL MEDICINE

## 2024-05-21 RX ORDER — METHOTREXATE 2.5 MG/1
12.5 TABLET ORAL WEEKLY
Qty: 60 TABLET | Refills: 0 | Status: SHIPPED | OUTPATIENT
Start: 2024-05-21

## 2024-05-21 NOTE — PROGRESS NOTES
Kathia Grissom is a 83 year old female.    HPI:     Chief Complaint   Patient presents with    Rheumatoid Arthritis       I had the pleasure of seeing Kathia Grissom on 5/21/2024 for follow up Seropositive RA.      Current Medications:  Methotrexate 5 pills weekly- on for 20 years  Advil 3 in AM and 2 in PM  Blood work:  , CCP>340    Interval History:  This is a 77 yo F with hx of HTN presents to establish care for RA.  She was diagnosed with rheumatoid arthritis approximately 35 years ago at the age of 42.  At that time she presented with joint pain and swelling in her hands.  Her RA mostly affects her hands.  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  Eyes any other joint pain or swelling.  Denies any morning stiffness.  She was formally seen by Dr. Nazanin Camara at [facility] but was too far for her. Denies any rashes, alopecia, oral ulcers, DVT or PE, RP, serositis, chest pain, shortness of breath, photosensitivity.    3/1/2021  Presents for follow-up of RA.  Last seen in July 2019  Remains on Methotrexate 5 pills weekly and FA daily  Also takes Advil 3 pills in am and 2 pills in pm, due to some stiffness in hands and hard to   States her joint pain is not bad, tolerable  No stiffness in AM    3/28/2022:  Presents for follow-up of RA.  Last seen in March 2021  On Methotrexate 5 pills weekly and FA daily   Blood work today shows normal CBC, creatinine, LFTs and inflammation markers  Joints are doing well, no pain or swelling.   Hard to make a fist but chronic   No pain or stiffness in AM.   Continues to take advil 3 in AM and 2 in PM    12/28/2022:  Presents for follow-up of RA.  Last seen in March 2021  On Methotrexate 5 pills weekly and FA daily   Recent blood work shows normal kidney and liver tests  No joint pain or swelling, RA is controlled     11/1/2023:  Presents for follow-up of RA.  On Methotrexate 5 pills weekly and FA daily   No joint pain or swelling, RA is  controlled   No rash or psoriasis  No s/e from medications    5/21/2024:  Presents for follow-up of RA.  On Methotrexate 5 pills weekly and FA daily   Also take advil as needed for some pain  No swelling in the joints but has chronic changes in the hands         HISTORY:  Past Medical History:    Rheumatoid arthritis (HCC)    Unspecified essential hypertension    Uterine prolapse      Social Hx Reviewed   Family Hx Reviewed     Medications (Active prior to today's visit):  Current Outpatient Medications   Medication Sig Dispense Refill    folic acid 1 MG Oral Tab Take 1 tablet (1 mg total) by mouth daily. 90 tablet 1    metoprolol succinate ER 50 MG Oral Tablet 24 Hr Take 1 tablet (50 mg total) by mouth daily. 90 tablet 0    hydroCHLOROthiazide 12.5 MG Oral Cap Take 1 capsule (12.5 mg total) by mouth daily. 90 capsule 0    methotrexate 2.5 MG Oral Tab Take 5 tablets (12.5 mg total) by mouth once a week. 60 tablet 1    cholecalciferol 25 MCG (1000 UT) Oral Cap Take  by mouth.       .cmed  Allergies:  No Known Allergies      ROS:   All other ROS are negative.     PHYSICAL EXAM:   GEN: AAOx3, NAD  HEENT: EOMI, PERRLA, no injection or icterus, oral mucosa moist, no oral lesions. No lymphadenopathy. No facial rash  CVS: RRR, no murmurs rubs or gallops. Equal 2+ distal pulses.   LUNGS: CTAB, no increased work of breathing  ABDOMEN:  soft NT/ND, +BS, no HSM  SKIN: No rashes or skin lesions. 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 No pain with ROM.   Ankles: FROM, no pain or swelling or warmth on  palpation  Feet: no pain with MTP squeeze, no toe swelling or pain or warmth on palpation with FROM  Spine: no lumbar or sacral pain on palpation.  NEURO: Cranial nerves II-XII intact grossly. 5/5 strength throughout in both upper and lower extremities, sensation intact.  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  States that her symptoms are stable with no stiffness or swelling  - At times she will take Advil 3 pills in the morning and 2 pills at night due to some stiffness and bone pain.  Last visit advised to stop the Advil and take Tylenol arthritis but she state that the Tylenol does not work.  Recent kidney and liver test were normal  - Recommended to continue methotrexate 5 pills weekly and folic acid daily.  - Advised to get blood work this week, she states she cannot do it today but will get it done this week. Last blood work was in June 2023    Pt will f/u in 6 mos      Daniela Dash MD  5/21/2024   5:53 PM

## 2024-05-21 NOTE — PATIENT INSTRUCTIONS
You were seen today for rheumatoid arthritis  You have chronic changes but overall your joints are doing well  Continue methotrexate 5 pills weekly and folic acid daily  Please get your blood work this week  You can see me in November

## 2024-05-30 ENCOUNTER — LAB ENCOUNTER (OUTPATIENT)
Dept: LAB | Age: 84
End: 2024-05-30
Attending: INTERNAL MEDICINE
Payer: MEDICARE

## 2024-05-30 DIAGNOSIS — M05.9 SEROPOSITIVE RHEUMATOID ARTHRITIS (HCC): ICD-10-CM

## 2024-05-30 DIAGNOSIS — Z51.81 MEDICATION MONITORING ENCOUNTER: ICD-10-CM

## 2024-05-30 LAB
ALBUMIN SERPL-MCNC: 3.8 G/DL (ref 3.4–5)
ALT SERPL-CCNC: 20 U/L
AST SERPL-CCNC: 29 U/L (ref 15–37)
BASOPHILS # BLD AUTO: 0.03 X10(3) UL (ref 0–0.2)
BASOPHILS NFR BLD AUTO: 0.5 %
CREAT BLD-MCNC: 0.94 MG/DL
CRP SERPL-MCNC: <0.29 MG/DL (ref ?–0.3)
EGFRCR SERPLBLD CKD-EPI 2021: 61 ML/MIN/1.73M2 (ref 60–?)
EOSINOPHIL # BLD AUTO: 0.16 X10(3) UL (ref 0–0.7)
EOSINOPHIL NFR BLD AUTO: 2.6 %
ERYTHROCYTE [DISTWIDTH] IN BLOOD BY AUTOMATED COUNT: 13.2 %
ERYTHROCYTE [SEDIMENTATION RATE] IN BLOOD: 20 MM/HR
HCT VFR BLD AUTO: 34.4 %
HGB BLD-MCNC: 11.9 G/DL
IMM GRANULOCYTES # BLD AUTO: 0.01 X10(3) UL (ref 0–1)
IMM GRANULOCYTES NFR BLD: 0.2 %
LYMPHOCYTES # BLD AUTO: 1.13 X10(3) UL (ref 1–4)
LYMPHOCYTES NFR BLD AUTO: 18.1 %
MCH RBC QN AUTO: 32.2 PG (ref 26–34)
MCHC RBC AUTO-ENTMCNC: 34.6 G/DL (ref 31–37)
MCV RBC AUTO: 93 FL
MONOCYTES # BLD AUTO: 0.37 X10(3) UL (ref 0.1–1)
MONOCYTES NFR BLD AUTO: 5.9 %
NEUTROPHILS # BLD AUTO: 4.55 X10 (3) UL (ref 1.5–7.7)
NEUTROPHILS # BLD AUTO: 4.55 X10(3) UL (ref 1.5–7.7)
NEUTROPHILS NFR BLD AUTO: 72.7 %
PLATELET # BLD AUTO: 202 10(3)UL (ref 150–450)
RBC # BLD AUTO: 3.7 X10(6)UL
WBC # BLD AUTO: 6.3 X10(3) UL (ref 4–11)

## 2024-05-30 PROCEDURE — 82040 ASSAY OF SERUM ALBUMIN: CPT

## 2024-05-30 PROCEDURE — 82565 ASSAY OF CREATININE: CPT

## 2024-05-30 PROCEDURE — 85652 RBC SED RATE AUTOMATED: CPT

## 2024-05-30 PROCEDURE — 85025 COMPLETE CBC W/AUTO DIFF WBC: CPT

## 2024-05-30 PROCEDURE — 86140 C-REACTIVE PROTEIN: CPT

## 2024-05-30 PROCEDURE — 84450 TRANSFERASE (AST) (SGOT): CPT

## 2024-05-30 PROCEDURE — 84460 ALANINE AMINO (ALT) (SGPT): CPT

## 2024-05-30 PROCEDURE — 36415 COLL VENOUS BLD VENIPUNCTURE: CPT

## 2024-06-10 ENCOUNTER — OFFICE VISIT (OUTPATIENT)
Dept: INTERNAL MEDICINE CLINIC | Facility: CLINIC | Age: 84
End: 2024-06-10

## 2024-06-10 VITALS
OXYGEN SATURATION: 94 % | BODY MASS INDEX: 19.83 KG/M2 | WEIGHT: 101 LBS | DIASTOLIC BLOOD PRESSURE: 65 MMHG | HEART RATE: 82 BPM | SYSTOLIC BLOOD PRESSURE: 135 MMHG | HEIGHT: 60 IN | TEMPERATURE: 97 F

## 2024-06-10 DIAGNOSIS — Z00.00 ANNUAL PHYSICAL EXAM: Primary | ICD-10-CM

## 2024-06-10 DIAGNOSIS — E46 PROTEIN-CALORIE MALNUTRITION, UNSPECIFIED SEVERITY (HCC): ICD-10-CM

## 2024-06-10 DIAGNOSIS — Z78.0 MENOPAUSE: ICD-10-CM

## 2024-06-10 PROBLEM — M05.9 RHEUMATOID ARTHRITIS WITH POSITIVE RHEUMATOID FACTOR (HCC): Status: ACTIVE | Noted: 2024-06-10

## 2024-06-10 PROBLEM — M05.9 RHEUMATOID ARTHRITIS WITH POSITIVE RHEUMATOID FACTOR (HCC): Status: ACTIVE | Noted: 2024-01-01

## 2024-06-10 LAB
CHOLEST SERPL-MCNC: 147 MG/DL (ref ?–200)
FASTING PATIENT LIPID ANSWER: YES
HDLC SERPL-MCNC: 62 MG/DL (ref 40–59)
LDLC SERPL CALC-MCNC: 69 MG/DL (ref ?–100)
NONHDLC SERPL-MCNC: 85 MG/DL (ref ?–130)
TRIGL SERPL-MCNC: 87 MG/DL (ref 30–149)
TSI SER-ACNC: 1.2 MIU/ML (ref 0.55–4.78)
VLDLC SERPL CALC-MCNC: 13 MG/DL (ref 0–30)

## 2024-06-10 PROCEDURE — G0439 PPPS, SUBSEQ VISIT: HCPCS | Performed by: INTERNAL MEDICINE

## 2024-06-10 PROCEDURE — 36415 COLL VENOUS BLD VENIPUNCTURE: CPT | Performed by: INTERNAL MEDICINE

## 2024-06-10 RX ORDER — METOPROLOL SUCCINATE 50 MG/1
50 TABLET, EXTENDED RELEASE ORAL DAILY
Qty: 90 TABLET | Refills: 0 | Status: SHIPPED | OUTPATIENT
Start: 2024-06-10

## 2024-06-10 RX ORDER — FOLIC ACID 1 MG/1
1 TABLET ORAL DAILY
Qty: 90 TABLET | Refills: 1 | Status: SHIPPED | OUTPATIENT
Start: 2024-06-10

## 2024-06-10 RX ORDER — HYDROCHLOROTHIAZIDE 12.5 MG/1
12.5 CAPSULE, GELATIN COATED ORAL DAILY
Qty: 90 CAPSULE | Refills: 0 | Status: SHIPPED | OUTPATIENT
Start: 2024-06-10

## 2024-06-10 NOTE — PROGRESS NOTES
Subjective:   Kathia Grissom is a 83 year old female who presents for a Medicare Subsequent Annual Wellness visit (Pt already had Initial Annual Wellness) and scheduled follow up of multiple significant but stable problems.       History/Other:   Fall Risk Assessment:   She has been screened for Falls and is High Risk. Fall Prevention information provided to patient in After Visit Summary.    Do you feel unsteady when standing or walking?: Yes  Do you worry about falling?: No  Have you fallen in the past year?: No     Cognitive Assessment:   She had a completely normal cognitive assessment - see flowsheet entries     Functional Ability/Status:   Kathia Grissom has some abnormal functions as listed below:  She has Driving difficulties based on screening of functional status. She has difficulties Shopping for Groceries based on screening of functional status. She has Vision problems based on screening of functional status.       Depression Screening (PHQ-2/PHQ-9): PHQ-2 SCORE: 0  , done 6/10/2024   Last [place] Suicide Screening on 6/10/2024 was No Risk.     5 minutes spent screening and counseling for depression    Advanced Directives:   She does NOT have a Living Will. [Do you have a living will?: No]  She does NOT have a Power of  for Health Care. [Do you have a healthcare power of ?: No]  Discussed Advance Care Planning with patient (and family/surrogate if present). Standard forms made available to patient in After Visit Summary.      Patient Active Problem List   Diagnosis    Hypertension, benign    Rheumatoid arthritis of multiple sites with negative rheumatoid factor (HCC)    Osteopenia     Allergies:  She has No Known Allergies.    Current Medications:  Outpatient Medications Marked as Taking for the 6/10/24 encounter (Office Visit) with Chapis Eller MD   Medication Sig    methotrexate 2.5 MG Oral Tab Take 5 tablets (12.5 mg total) by mouth once a week.    folic acid 1 MG Oral Tab Take 1  tablet (1 mg total) by mouth daily.    metoprolol succinate ER 50 MG Oral Tablet 24 Hr Take 1 tablet (50 mg total) by mouth daily.    hydroCHLOROthiazide 12.5 MG Oral Cap Take 1 capsule (12.5 mg total) by mouth daily.    cholecalciferol 25 MCG (1000 UT) Oral Cap Take  by mouth.       Medical History:  She  has a past medical history of Rheumatoid arthritis (HCC), Unspecified essential hypertension, and Uterine prolapse.  Surgical History:  She  has a past surgical history that includes hysterectomy (07/2016).   Family History:  Her family history includes Diabetes (age of onset: 40) in her father; Diabetes (age of onset: 57) in her sister; Heart Disease (age of onset: 40) in her father; Hypertension in her father; Lung Disorder in her brother.  Social History:  She  reports that she has never smoked. She has never used smokeless tobacco. She reports that she does not drink alcohol and does not use drugs.    Tobacco:  She has never smoked tobacco.    CAGE Alcohol Screen:   CAGE screening score of 0 on 6/10/2024, showing low risk of alcohol abuse.      Patient Care Team:  Chapis Eller MD as PCP - General (Internal Medicine)  Nazanin Camara (RHEUMATOLOGY)  Alisson Farris MD (OBSTETRICS & GYNECOLOGY)    Review of Systems  GENERAL: feels well otherwise  SKIN: denies any unusual skin lesions  EYES: denies blurred vision or double vision  HEENT: denies nasal congestion, sinus pain or ST  LUNGS: denies shortness of breath with exertion  CARDIOVASCULAR: denies chest pain on exertion  GI: denies abdominal pain, denies heartburn  : denies dysuria, vaginal discharge or itching, no complaint of urinary incontinence   MUSCULOSKELETAL: denies back pain  NEURO: denies headaches  PSYCHE: denies depression or anxiety  HEMATOLOGIC: denies hx of anemia  ENDOCRINE: denies thyroid history  ALL/ASTHMA: denies hx of allergy or asthma    Objective:   Physical Exam  General Appearance:  Alert, cooperative, no distress, appears stated age    Head:  Normocephalic, without obvious abnormality, atraumatic   Eyes:  PERRL, conjunctiva/corneas clear, EOM's intact both eyes   Ears:  Normal TM's and external ear canals, both ears   Nose: Nares normal, septum midline,mucosa normal, no drainage or sinus tenderness   Throat: Lips, mucosa, and tongue normal; teeth and gums normal   Neck: Supple, symmetrical, trachea midline, no adenopathy;  thyroid: not enlarged, symmetric, no tenderness/mass/nodules; no carotid bruit or JVD   Back:   Symmetric, no curvature, ROM normal, no CVA tenderness   Lungs:   Clear to auscultation bilaterally, respirations unlabored   Heart:  Regular rate and rhythm, S1 and S2 normal, no murmur, rub, or gallop   Abdomen:   Soft, non-tender, bowel sounds active all four quadrants,  no masses, no organomegaly   Pelvic: Deferred   Extremities: Extremities normal, atraumatic, no cyanosis or edema   Pulses: 2+ and symmetric   Skin: Skin color, texture, turgor normal, no rashes or lesions   Lymph nodes: Cervical, supraclavicular, and axillary nodes normal   Neurologic: Normal       /82 (BP Location: Right arm, Patient Position: Sitting, Cuff Size: adult)   Pulse 82   Temp 97.2 °F (36.2 °C) (Temporal)   Ht 5' (1.524 m)   Wt 101 lb (45.8 kg)   SpO2 94%   BMI 19.73 kg/m²  Estimated body mass index is 19.73 kg/m² as calculated from the following:    Height as of this encounter: 5' (1.524 m).    Weight as of this encounter: 101 lb (45.8 kg).    Medicare Hearing Assessment:   Hearing Screening    Screening Method: Questionnaire  I have a problem hearing over the telephone: No I have trouble following the conversations when two or more people are talking at the same time: No   I have trouble understanding things on the TV: No I have to strain to understand conversations: No   I have to worry about missing the telephone ring or doorbell: No I have trouble hearing conversations in a noisy background such as a crowded room or restaurant: No    I get confused about where sounds come from: No I misunderstand some words in a sentence and need to ask people to repeat themselves: No   I especially have trouble understanding the speech of women and children: No I have trouble understanding the speaker in a large room such as at a meeting or place of V[employer]oo: No   Many people I talk to seem to mumble (or don't speak clearly): No People get annoyed because I misunderstand what they say: No   I misunderstand what others are saying and make inappropriate responses: No I avoid social activities because I cannot hear well and fear I will reply improperly: No   Family members and friends have told me they think I may have hearing loss: No             Visual Acuity:     Right Eye Chart Acuity: 20/50     Left Eye Chart Acuity: 20/50     Both Eyes Chart Acuity: 20/50            Assessment & Plan:   Kathia Grissom is a 83 year old female who presents for a Medicare Assessment.     Essential hypertension blood pressure continue with current medication    Osteopenia will order bone density scan    Rheumatoid arthritis with positive rheumatoid factor, I will continue with methotrexate she is following with rheumatology    There are no diagnoses linked to this encounter.  The patient indicates understanding of these issues and agrees to the plan.  Reinforced healthy diet, lifestyle, and exercise.      No follow-ups on file.     GEOFFREY HAAS MD, 6/10/2024     Supplementary Documentation:   General Health:  In the past six months, have you lost more than 10 pounds without trying?: 2 - No  Has your appetite been poor?: No  Type of Diet: Other (none)  How does the patient maintain a good energy level?: Other (sometimes)  How would you describe your daily physical activity?: Light  How would you describe your current health state?: Good  How do you maintain positive mental well-being?: Social Interaction  On a scale of 0 to 10, with 0 being no pain and 10 being severe pain,  what is your pain level?: 0 - (None)  In the past six months, have you experienced urine leakage?: 1-Yes  At any time do you feel concerned for the safety/well-being of yourself and/or your children, in your home or elsewhere?: No  Have you had any immunizations at another office such as Influenza, Hepatitis B, Tetanus, or Pneumococcal?: No       Kathia Grissom's SCREENING SCHEDULE   Tests on this list are recommended by your physician but may not be covered, or covered at this frequency, by your insurer.   Please check with your insurance carrier before scheduling to verify coverage.   PREVENTATIVE SERVICES FREQUENCY &  COVERAGE DETAILS LAST COMPLETION DATE   Diabetes Screening    Fasting Blood Sugar /  Glucose    One screening every 12 months if never tested or if previously tested but not diagnosed with pre-diabetes   One screening every 6 months if diagnosed with pre-diabetes Lab Results   Component Value Date    GLU 94 06/05/2023        Cardiovascular Disease Screening    Lipid Panel  Cholesterol  Lipoprotein (HDL)  Triglycerides Covered every 5 years for all Medicare beneficiaries without apparent signs or symptoms of cardiovascular disease Lab Results   Component Value Date    CHOLEST 147 06/05/2023    HDL 65 (H) 06/05/2023    LDL 64 06/05/2023    TRIG 98 06/05/2023         Electrocardiogram (EKG)   Covered if needed at Welcome to Medicare, and non-screening if indicated for medical reasons 08/14/2021      Ultrasound Screening for Abdominal Aortic Aneurysm (AAA) Covered once in a lifetime for one of the following risk factors    Men who are 65-75 years old and have ever smoked    Anyone with a family history -     Colorectal Cancer Screening  Covered for ages 50-85; only need ONE of the following:    Colonoscopy   Covered every 10 years    Covered every 2 years if patient is at high risk or previous colonoscopy was abnormal -    No recommendations at this time    Flexible Sigmoidoscopy   Covered every 4 years  -    Fecal Occult Blood Test Covered annually -   Bone Density Screening    Bone density screening    Covered every 2 years after age 65 if diagnosed with risk of osteoporosis or estrogen deficiency.    Covered yearly for long-term glucocorticoid medication use (Steroids) No results found for this or any previous visit.      Health Maintenance Due   Topic Date Due    DEXA Scan  Never done      Pap and Pelvic    Pap   Covered every 2 years for women at normal risk; Annually if at high risk -  No recommendations at this time    Chlamydia Annually if high risk -  No recommendations at this time   Screening Mammogram    Mammogram     Recommend annually for all female patients aged 40 and older    One baseline mammogram covered for patients aged 35-39 -    No recommendations at this time    Immunizations    Influenza Covered once per flu season  Please get every year -  No recommendations at this time    Pneumococcal Each vaccine (Iynlywv49 & Evmwcoquw51) covered once after 65 Prevnar 13: 03/30/2016    Ihwdjyezd10: 10/19/2016     No recommendations at this time    Hepatitis B One screening covered for patients with certain risk factors   -  No recommendations at this time    Tetanus Toxoid Not covered by Medicare Part B unless medically necessary (cut with metal); may be covered with your pharmacy prescription benefits -    Tetanus, Diptheria and Pertusis TD and TDaP Not covered by Medicare Part B -  No recommendations at this time    Zoster Not covered by Medicare Part B; may be covered with your pharmacy  prescription benefits -  Zoster Vaccines(1 of 2) Never done     Annual Monitoring of Persistent Medications (ACE/ARB, digoxin diuretics, anticonvulsants)    Potassium Annually Lab Results   Component Value Date    K 3.7 06/05/2023         Creatinine   Annually Lab Results   Component Value Date    CREATSERUM 0.94 05/30/2024         BUN Annually Lab Results   Component Value Date    BUN 18 06/05/2023       Drug Serum  Conc Annually No results found for: \"DIGOXIN\", \"DIG\", \"VALP\"

## 2024-07-30 ENCOUNTER — APPOINTMENT (OUTPATIENT)
Dept: GENERAL RADIOLOGY | Age: 84
End: 2024-07-30
Payer: MEDICARE

## 2024-07-30 ENCOUNTER — HOSPITAL ENCOUNTER (EMERGENCY)
Age: 84
Discharge: HOME | End: 2024-07-31
Attending: EMERGENCY MEDICINE
Payer: MEDICARE

## 2024-07-30 DIAGNOSIS — S22.060A COMPRESSION FRACTURE OF T8 VERTEBRA, INITIAL ENCOUNTER (HCC): Primary | ICD-10-CM

## 2024-07-30 DIAGNOSIS — S22.060A COMPRESSION FRACTURE OF T7 VERTEBRA, INITIAL ENCOUNTER (HCC): ICD-10-CM

## 2024-07-30 PROCEDURE — 99285 EMERGENCY DEPT VISIT HI MDM: CPT

## 2024-07-30 PROCEDURE — 72072 X-RAY EXAM THORAC SPINE 3VWS: CPT

## 2024-07-30 PROCEDURE — 99284 EMERGENCY DEPT VISIT MOD MDM: CPT

## 2024-07-31 ENCOUNTER — APPOINTMENT (OUTPATIENT)
Dept: CT IMAGING | Age: 84
End: 2024-07-31
Attending: EMERGENCY MEDICINE
Payer: MEDICARE

## 2024-07-31 ENCOUNTER — PATIENT OUTREACH (OUTPATIENT)
Dept: CASE MANAGEMENT | Age: 84
End: 2024-07-31

## 2024-07-31 VITALS
BODY MASS INDEX: 20.16 KG/M2 | TEMPERATURE: 100 F | OXYGEN SATURATION: 96 % | WEIGHT: 100 LBS | HEART RATE: 87 BPM | SYSTOLIC BLOOD PRESSURE: 114 MMHG | HEIGHT: 59 IN | DIASTOLIC BLOOD PRESSURE: 70 MMHG | RESPIRATION RATE: 16 BRPM

## 2024-07-31 PROCEDURE — 72128 CT CHEST SPINE W/O DYE: CPT | Performed by: EMERGENCY MEDICINE

## 2024-07-31 RX ORDER — LIDOCAINE 50 MG/G
1 PATCH TOPICAL EVERY 24 HOURS
Qty: 30 EACH | Refills: 0 | Status: SHIPPED | OUTPATIENT
Start: 2024-07-31

## 2024-07-31 RX ORDER — TRAMADOL HYDROCHLORIDE 50 MG/1
TABLET ORAL EVERY 6 HOURS PRN
Qty: 10 TABLET | Refills: 0 | Status: SHIPPED | OUTPATIENT
Start: 2024-07-31 | End: 2024-08-05

## 2024-07-31 NOTE — DISCHARGE INSTRUCTIONS
Please follow-up with your primary care physician 1-2 days return to the ER if your symptoms worsen progress or if you have any further concerns.  Please keep the TLSO brace on when moving around or out of bed.  Please follow-up with neurosurgery call in the morning schedule an appointment for close follow-up.  Apply lidocaine patches as instructed as needed for back pain.  Remove every 24 hours.  Please take tramadol only as needed for severe pain otherwise to take Tylenol 650 mg every 6 hours as needed for pain control.    If you develop tingling numbness or weakness in your extremities return to the ER for further evaluation.

## 2024-07-31 NOTE — ED QUICK NOTES
Called  [facility] for urgent fitting of TLSO brace. Per answering service, \"will contact the on-call provider.\"

## 2024-07-31 NOTE — PROGRESS NOTES
Pt had recent ED visit, calling to offer REGI ED follow-up apt (discharged 07/30)    Dr Chapis Eller  Internal Medicine  [address] [address] [phone]  Unable to contact pt (vm not set up)

## 2024-07-31 NOTE — ED PROVIDER NOTES
Patient Seen in: [facility] In [place]      History     Chief Complaint   Patient presents with    Back Pain     Stated Complaint: back pain, middle. started saturday.    Subjective:   HPI    This very pleasant 83-year-old female who has past medical history of rheumatoid arthritis and hypertension who presents ED with complaints of mid back pain.  Patient reports symptoms going on for about a week now.  Denies any inciting source or relieving source.  Has been taking over-the-counter medications without improvement.  Denies any paresthesias or weakness in extremities.  Denies any cough fever chills runny nose sore throat no recent illnesses no falls.  Reports history of a pelvic fracture 3 years ago but denies any further falls or injuries since then.  Reports the pain is worse when she is moving out of bed otherwise denies pain with rest.    Objective:   Past Medical History:    Rheumatoid arthritis (HCC)    Unspecified essential hypertension    Uterine prolapse              Past Surgical History:   Procedure Laterality Date    Hysterectomy  07/2016    Adam/Kaleigh Tangcomplete w/bladder sling                Social History     Socioeconomic History    Marital status:    Tobacco Use    Smoking status: Never    Smokeless tobacco: Never   Vaping Use    Vaping status: Never Used   Substance and Sexual Activity    Alcohol use: No     Alcohol/week: 0.0 standard drinks of alcohol    Drug use: No   Other Topics Concern    Caffeine Concern Yes     Comment: Coffee              Review of Systems    Positive for stated Chief Complaint: Back Pain    Other systems are as noted in HPI.  Constitutional and vital signs reviewed.      All other systems reviewed and negative except as noted above.    Physical Exam     ED Triage Vitals [07/30/24 2228]   /66   Pulse 84   Resp 18   Temp 99.5 °F (37.5 °C)   Temp src Temporal   SpO2 95 %   O2 Device None (Room air)       Current Vitals:   Vital Signs  BP:  129/59  Pulse: 78  Resp: 18  Temp: 99.5 °F (37.5 °C)  Temp src: Temporal    Oxygen Therapy  SpO2: 96 %  O2 Device: None (Room air)            Physical Exam  Vitals and nursing note reviewed.   Constitutional:       Appearance: She is well-developed.   HENT:      Head: Normocephalic and atraumatic.   Eyes:      Pupils: Pupils are equal, round, and reactive to light.   Cardiovascular:      Rate and Rhythm: Normal rate and regular rhythm.   Pulmonary:      Effort: Pulmonary effort is normal.      Breath sounds: Normal breath sounds.   Abdominal:      General: Bowel sounds are normal.      Palpations: Abdomen is soft.   Musculoskeletal:         General: No deformity.      Cervical back: Normal range of motion and neck supple.      Comments: No midline CTL spine tenderness or establish deformities paraspinal muscular tenderness in the mid thoracic spine region   Skin:     General: Skin is warm and dry.      Capillary Refill: Capillary refill takes less than 2 seconds.   Neurological:      Mental Status: She is alert and oriented to person, place, and time.               ED Course   Labs Reviewed - No data to display         CT THORACIC SPINE WITHOUT CONTRAST      IMPRESSION:    Acute or subacute appearing compression fracture deformity at the inferior endplate of T8, with approximately 30% loss of height and 1 mm retropulsion.  There is severe compression deformity of the T7 vertebral body.  A subtle cortical deformity and slight retropulsion at the posterior cortex (series 62, image 36) suggests this may be acute/subacute on chronic.   Associated paravertebral fat infiltration in this area.    No significant spinal canal stenoses at either level.    Additional mild compression deformities in the lower thoracic spine and upper lumbar spine, age indeterminate.  MRI can be considered for further characterization.    Diffuse bony demineralization.    Cholelithiasis.  Partially visualized possible splenic artery  aneurysms.           MDM      This is a morning 83-year-old female who presents ED with complaints of mid back pain.  Vital signs stable arrival patient appears nontoxic examination no midline CTL spine tenderness palpation step-offs deformities paraspinal muscular tenderness in thoracic spine region.  No weakness normal sensation of the lower extremities.  X-rays concerning for possible fracture of indeterminate age.  His CT scan of thoracic spine obtained and T7-T8 compression fractures seen.  Patient had a TLSO brace applied.  Discussed close follow-up with neurosurgery strict return precaution instructed.  Patient and her family show understanding of plan and are in agreement plan discharged home in stable condition.                                   Medical Decision Making      Disposition and Plan     Clinical Impression:  1. Compression fracture of T8 vertebra, initial encounter ([facility])    2. Compression fracture of T7 vertebra, initial encounter ([facility])         Disposition:  Discharge  7/31/2024  5:27 am    Follow-up:  Jr Shabazz MD  [facility] Mis Gamez, [address] [address]  [phone]    Call in 1 day(s)  For close follow up          Medications Prescribed:  Current Discharge Medication List        START taking these medications    Details   traMADol 50 MG Oral Tab Take 1-2 tablets ( mg total) by mouth every 6 (six) hours as needed for Pain.  Qty: 10 tablet, Refills: 0    Associated Diagnoses: Compression fracture of T8 vertebra, initial encounter ([facility]); Compression fracture of T7 vertebra, initial encounter ([facility])      lidocaine 5 % External Patch Place 1 patch onto the skin daily.  Qty: 30 each, Refills: 0    Associated Diagnoses: Compression fracture of T8 vertebra, initial encounter ([facility]); Compression fracture of T7 vertebra, initial encounter ([facility])

## 2024-08-01 NOTE — PROGRESS NOTES
Pt had recent ED visit, calling to offer REGI ED follow-up apt (discharged 07/30)     Dr Chapis Eller  Internal Medicine  [address] [address] [phone]  Pt declined apt; pt advised her daughter, Allyssa will call to schedule  Closing encounter

## 2024-08-12 ENCOUNTER — HOSPITAL ENCOUNTER (EMERGENCY)
Age: 84
Discharge: HOME | End: 2024-08-12
Attending: EMERGENCY MEDICINE
Payer: MEDICARE

## 2024-08-12 ENCOUNTER — TELEPHONE (OUTPATIENT)
Dept: SURGERY | Facility: CLINIC | Age: 84
End: 2024-08-12

## 2024-08-12 ENCOUNTER — PATIENT OUTREACH (OUTPATIENT)
Dept: CASE MANAGEMENT | Age: 84
End: 2024-08-12

## 2024-08-12 VITALS
HEIGHT: 57 IN | OXYGEN SATURATION: 100 % | TEMPERATURE: 97 F | BODY MASS INDEX: 21.57 KG/M2 | WEIGHT: 100 LBS | DIASTOLIC BLOOD PRESSURE: 84 MMHG | HEART RATE: 72 BPM | RESPIRATION RATE: 16 BRPM | SYSTOLIC BLOOD PRESSURE: 146 MMHG

## 2024-08-12 DIAGNOSIS — S22.060D COMPRESSION FRACTURE OF T7 VERTEBRA WITH ROUTINE HEALING, SUBSEQUENT ENCOUNTER: Primary | ICD-10-CM

## 2024-08-12 DIAGNOSIS — S22.060A COMPRESSION FRACTURE OF T8 VERTEBRA, INITIAL ENCOUNTER (HCC): ICD-10-CM

## 2024-08-12 PROCEDURE — 99282 EMERGENCY DEPT VISIT SF MDM: CPT

## 2024-08-12 PROCEDURE — 99283 EMERGENCY DEPT VISIT LOW MDM: CPT

## 2024-08-12 NOTE — ED INITIAL ASSESSMENT (HPI)
Pt daughter states pt was recently here for a thoracic compression fracture and was discharged back home with a brace to wear. Pt states she is also having left and right side rib pain. Sore in nature, 7/10 in severity. Believes it began after wearing the brace. Pt is also concerned for shingles, no rash noted by the patient.

## 2024-08-12 NOTE — TELEPHONE ENCOUNTER
Left message @ [phone] (Daughter Allyssa) since no answe at the alternate numbers . PA is out sick  8/14/24 appointment will need to be reschedule  
No

## 2024-08-13 ENCOUNTER — TELEPHONE (OUTPATIENT)
Dept: SURGERY | Facility: CLINIC | Age: 84
End: 2024-08-13

## 2024-08-13 NOTE — TELEPHONE ENCOUNTER
Reached out to patient to inform that she needs to reschedule apt on 08/14/2024 with Carolyn Han due to provider being out office.     Could not leave voicemail for patient due to phone having no voicemail.    Sent my chart message to patient to notify apt in being canceled.     Please assist patient when calling back to reschedule

## 2024-08-13 NOTE — DISCHARGE INSTRUCTIONS
Take 1000 mg acetaminophen (2 Tylenol tablets) and/or 600 mg ibuprofen (3 Advil tablets) every 8 hours as needed

## 2024-08-13 NOTE — PROGRESS NOTES
Pt had recent ED visit, calling to offer REGI ED follow-up apt (discharged 08/12)    Dr Chapis Eller  Internal Medicine  [address] [address]  [phone]  [facility] w/pt daughterAllyssa to call [phone] to assist w/scheduling apt

## 2024-08-13 NOTE — ED PROVIDER NOTES
Patient Seen in: [facility] In [place]      History     Chief Complaint   Patient presents with    Pain     Stated Complaint: here 2 weeks ago and was given a back brace. now has complaints to bilateral si*    Subjective:     HPI    83 yo F presented with rheumatoid arthritis and T7 and T8 compression fractures diagnosed two weeks prior to the current visit. The fracture was identified through a CT scan during her previous visit. The patient was provided with a brace to wear for her back, but she had not been wearing it for a few days prior to the current visit. The patient came in today due to pain around the middle of her abdomen. Initially, she thought she might have shingles due to the pain, but there was no rash present. The pain was bilateral in the brace was applied.     Objective:   Past Medical History:    Rheumatoid arthritis (HCC)    Unspecified essential hypertension    Uterine prolapse              Past Surgical History:   Procedure Laterality Date    Hysterectomy  07/2016    Adam/Kaleigh jones w/bladder sling                Social History     Socioeconomic History    Marital status:    Tobacco Use    Smoking status: Never    Smokeless tobacco: Never   Vaping Use    Vaping status: Never Used   Substance and Sexual Activity    Alcohol use: No     Alcohol/week: 0.0 standard drinks of alcohol    Drug use: No   Other Topics Concern    Caffeine Concern Yes     Comment: Coffee              Review of Systems    Positive for stated complaint: here 2 weeks ago and was given a back brace. now has complaints to bilateral si*  Other systems are as noted in HPI.  Constitutional and vital signs reviewed.      All other systems reviewed and negative except as noted above.    Physical Exam     ED Triage Vitals [08/12/24 1728]   /57   Pulse 76   Resp 18   Temp 97.4 °F (36.3 °C)   Temp src Temporal   SpO2 96 %   O2 Device None (Room air)       Current:/84   Pulse 72   Temp  97.4 °F (36.3 °C) (Temporal)   Resp 16   Ht 144.8 cm (4' 9\")   Wt 45.4 kg   SpO2 100%   BMI 21.64 kg/m²       General:  Vitals as listed.  No acute distress   HEENT: Sclerae anicteric.  Conjunctivae show no pallor.  Oropharynx clear, mucous membranes moist   Lungs: good air exchange  Back Kyphotic and mid thoracic mild tenderness  Abdomen: Soft and nontender.  No abdominal masses.  No peritoneal signs   Extremities: no edema, normal peripheral pulses   Neuro: Alert oriented and nonfocal    ED COURSE and MDM     Sources of the medical history included the patient and her daughter    Patient has scheduled follow-up with spine surgery on Wednesday.    To use Tylenol/ibuprofen for discomfort.    I have discussed with the patient the results of testing, differential diagnosis, and treatment plan. They expressed clear understanding of these instructions and agrees to the plan provided.    Disposition and Plan     Clinical Impression:  1. Compression fracture of T7 vertebra with routine healing, subsequent encounter    2. Compression fracture of T8 vertebra, initial encounter ([facility])         Disposition:  Discharge  8/12/2024  8:08 pm    Follow-up:  Jr Shabazz MD  [facility] Mis Gamez, [address] [address]  [phone]    Go in 2 day(s)  as scheduled - make sure you are going to the correct office        Medications Prescribed:  Discharge Medication List as of 8/12/2024  8:09 PM

## 2024-08-14 NOTE — PROGRESS NOTES
Pt had recent ED visit, calling to offer REGI ED follow-up apt (discharged 08/12)    Dr Chapis Eller  Internal Medicine  [address] [address]  [phone]  [facility] w/pt daughterAllyssa to call [phone] to assist w/scheduling apt

## 2024-08-15 NOTE — PROGRESS NOTES
Pt had recent ED visit, calling to offer REGI ED follow-up apt.    Dr Chapis Eller  Internal Medicine  [place] [id number] [phone]  By 8/19    No answer, left a voicemail with callback information.    Closing encounter.

## 2024-08-20 ENCOUNTER — OFFICE VISIT (OUTPATIENT)
Dept: SURGERY | Facility: CLINIC | Age: 84
End: 2024-08-20
Payer: MEDICARE

## 2024-08-20 VITALS — HEART RATE: 84 BPM | DIASTOLIC BLOOD PRESSURE: 76 MMHG | SYSTOLIC BLOOD PRESSURE: 128 MMHG

## 2024-08-20 DIAGNOSIS — S22.080A COMPRESSION FRACTURE OF T12 VERTEBRA, INITIAL ENCOUNTER (HCC): ICD-10-CM

## 2024-08-20 DIAGNOSIS — M80.00XA AGE-RELATED OSTEOPOROSIS WITH CURRENT PATHOLOGICAL FRACTURE, INITIAL ENCOUNTER: ICD-10-CM

## 2024-08-20 DIAGNOSIS — S22.060A COMPRESSION FRACTURE OF T7 VERTEBRA, INITIAL ENCOUNTER (HCC): ICD-10-CM

## 2024-08-20 DIAGNOSIS — M54.6 ACUTE MIDLINE THORACIC BACK PAIN: ICD-10-CM

## 2024-08-20 DIAGNOSIS — S32.010A COMPRESSION FRACTURE OF L1 VERTEBRA, INITIAL ENCOUNTER (HCC): ICD-10-CM

## 2024-08-20 DIAGNOSIS — S22.060A COMPRESSION FRACTURE OF T8 VERTEBRA, INITIAL ENCOUNTER (HCC): Primary | ICD-10-CM

## 2024-08-20 PROCEDURE — 99205 OFFICE O/P NEW HI 60 MIN: CPT

## 2024-08-20 NOTE — PROGRESS NOTES
New patient is here today and presents with Compression fx-T7 and T8 compression- seen in ED on  and 24      Imagin2024- CT SPINE THORACIC  24- XR Thoracic Spine    Treatments:  TLSO brace

## 2024-08-20 NOTE — PATIENT INSTRUCTIONS
Refill policies:    Allow 2-3 business days for refills; controlled substances may take longer.  Contact your pharmacy at least 5 days prior to running out of medication and have them send an electronic request or submit request through the “request refill” option in your [employer] account.  Refills are not addressed on weekends; covering physicians do not authorize routine medications on weekends.  No narcotics or controlled substances are refilled after noon on Fridays or by on call physicians.  By law, narcotics must be electronically prescribed.  A 30 day supply with no refills is the maximum allowed.  If your prescription is due for a refill, you may be due for a follow up appointment.  To best provide you care, patients receiving routine medications need to be seen at least once a year.  Patients receiving narcotic/controlled substance medications need to be seen at least once every 3 months.  In the event that your preferred pharmacy does not have the requested medication in stock (e.g. Backordered), it is your responsibility to find another pharmacy that has the requested medication available.  We will gladly send a new prescription to that pharmacy at your request.    Scheduling Tests:    If your physician has ordered radiology tests such as MRI or CT scans, please contact Central Scheduling at [phone] right away to schedule the test.  Once scheduled, the [facility] hospital Centralized Referral Team will work with your insurance carrier to obtain pre-certification or prior authorization.  Depending on your insurance carrier, approval may take 3-10 days.  It is highly recommended patients assure they have received an authorization before having a test performed.  If test is done without insurance authorization, patient may be responsible for the entire amount billed.      Precertification and Prior Authorizations:  If your physician has recommended that you have a procedure or additional testing performed the Critical access hospital  Centralized Referral Team will contact your insurance carrier to obtain pre-certification or prior authorization.    You are strongly encouraged to contact your insurance carrier to verify that your procedure/test has been approved and is a COVERED benefit.  Although the [facility] Centralized Referral Team does its due diligence, the insurance carrier gives the disclaimer that \"Although the procedure is authorized, this does not guarantee payment.\"    Ultimately the patient is responsible for payment.   Thank you for your understanding in this matter.  Paperwork Completion:  If you require FMLA or disability paperwork for your recovery, please make sure to either drop it off or have it faxed to our office at [phone]. Be sure the form has your name and date of birth on it.  The form will be faxed to our Forms Department and they will complete it for you.  There is a 25$ fee for all forms that need to be filled out.  Please be aware there is a 10-14 day turnaround time.  You will need to sign a release of information (ROSENDO) form if your paperwork does not come with one.  You may call the Forms Department with any questions at [phone].  Their fax number is [phone].

## 2024-09-04 ENCOUNTER — HOSPITAL ENCOUNTER (INPATIENT)
Facility: HOSPITAL | Age: 84
LOS: 1 days | Discharge: HOME HEALTH | End: 2024-09-06
Attending: EMERGENCY MEDICINE | Admitting: INTERNAL MEDICINE
Payer: MEDICARE

## 2024-09-04 PROBLEM — E87.1 HYPONATREMIA: Status: ACTIVE | Noted: 2024-09-04

## 2024-09-04 PROBLEM — E87.1 HYPONATREMIA: Status: ACTIVE | Noted: 2024-01-01

## 2024-09-04 NOTE — ED PROVIDER NOTES
Patient Seen in: [facility] [place]      History     Chief Complaint   Patient presents with    Rash Skin Problem     Stated Complaint: skin burning on abd    Subjective:   HPI    84-year-old female.  Medical history rheumatoid arthritis, hypertension.  Patient was evaluated 1 month prior to arrival in the ER for back pain.  Subsequent  imaging revealed lumbar compression fractures.  A back brace was ordered.  Patient wore this brace for a few weeks before self removing.  She return to the ER complaining of a burning type sensation across her abdomen.  Thought to possibly be early shingles versus irritation from the brace.  Despite having remove the brace for 2 weeks, the sensation persists.  She arrives with her daughters were also concerned that she might be dehydrated.  They state that she eats and drinks very little.  They also claim that she will occasionally complain of leg weakness.  Patient currently denies this    Objective:   Past Medical History:    Rheumatoid arthritis (HCC)    Unspecified essential hypertension    Uterine prolapse              Past Surgical History:   Procedure Laterality Date    Hysterectomy  07/2016    Adam/Kaleigh 'complete w/bladder sling                Social History     Socioeconomic History    Marital status:    Tobacco Use    Smoking status: Never    Smokeless tobacco: Never   Vaping Use    Vaping status: Never Used   Substance and Sexual Activity    Alcohol use: No     Alcohol/week: 0.0 standard drinks of alcohol    Drug use: No   Other Topics Concern    Caffeine Concern Yes     Comment: Coffee              Review of Systems    Positive for stated Chief Complaint: Rash Skin Problem    Other systems are as noted in HPI.  Constitutional and vital signs reviewed.      All other systems reviewed and negative except as noted above.    Physical Exam     ED Triage Vitals [09/04/24 1736]   /57   Pulse 77   Resp 18   Temp 97.5 °F (36.4 °C)   Temp  src Temporal   SpO2 96 %   O2 Device None (Room air)       Current Vitals:   Vital Signs  BP: 102/85  Pulse: 70  Resp: 18  Temp: 97.5 °F (36.4 °C)  Temp src: Temporal    Oxygen Therapy  SpO2: 100 %  O2 Device: None (Room air)            Physical Exam    Gen: Well appearing, well groomed, alert and aware x 3  Neck: Supple, full range of motion, no thyromegaly or lymphadenopathy.  Abdominal: Soft exam without distention.  No pain to palpation all 4 quadrants. No organomegaly.  Normal bowel sounds.  Back: Full active range of motion.  No CVA tenderness bilaterally.  Lung: No distress, RR, no retraction, breath sounds are clear bilaterally  Cardio: Regular rate and rhythm, normal S1-S2, no murmur appreciable    ED Course     Labs Reviewed   CBC WITH DIFFERENTIAL WITH PLATELET - Abnormal; Notable for the following components:       Result Value    RBC 3.33 (*)     HGB 10.8 (*)     HCT 29.9 (*)     Lymphocyte Absolute 0.30 (*)     All other components within normal limits   COMP METABOLIC PANEL (14) - Abnormal; Notable for the following components:    Glucose 130 (*)     Sodium 122 (*)     Potassium 2.2 (*)     Chloride 82 (*)     CO2 33.0 (*)     BUN 26 (*)     Creatinine 1.68 (*)     Calculated Osmolality 261 (*)     eGFR-Cr 30 (*)     Albumin 3.0 (*)     A/G Ratio 0.8 (*)     All other components within normal limits   MAGNESIUM - Abnormal; Notable for the following components:    Magnesium 1.5 (*)     All other components within normal limits   SCAN SLIDE   URINALYSIS, ROUTINE               EKG    Rate, intervals and axes as noted on EKG Report.  Rate: 66  Rhythm: Sinus Rhythm  Reading: Normal sinus rhythm.  No evidence of acute ischemic changes               MDM        Admission disposition: 9/4/2024  7:34 PM           This is a very well-appearing female with normal vital signs.  Her skin exam is benign.  No evidence of rash or cirrhosis.  Possible neuropathy related to historical shingles.  We discussed this.  Per  family, patient also has poor p.o. intake and occasionally complains of leg weakness.  At this time, patient denies any weakness.  Normal recent health.    My supervising physician was involved in the management of this patient.    Fluid bolus.  CBC, CMP and urinalysis.    Will consider low-dose gabapentin with primary care follow-up        Supervising physician was notified by lab of critical result.  Potassium 2.2, sodium 122, glucose 130, chloride 82, BUN of 26, CO2 of 33, creatinine 1.68    CBC demonstrates no leukocytosis.  Hemoglobin of 10.8         Patient given 20 mEq of oral Klor-Con and a 20 mill equivalent rider was begun    We will admit to the hospitalist.  Patient placed on cardiac monitor            Medical Decision Making      Disposition and Plan     Clinical Impression:  1. Hyponatremia    2. Hypokalemia    3. LENO (acute kidney injury) (HCC)         Disposition:  Admit  9/4/2024  7:34 pm    Follow-up:  No follow-up provider specified.        Medications Prescribed:  Current Discharge Medication List                            Hospital Problems       Present on Admission  Date Reviewed: 8/20/2024            ICD-10-CM Noted POA    * (Principal) Hyponatremia E87.1 9/4/2024 Unknown

## 2024-09-05 PROBLEM — N17.9 AKI (ACUTE KIDNEY INJURY): Status: ACTIVE | Noted: 2024-01-01

## 2024-09-05 PROBLEM — N17.9 AKI (ACUTE KIDNEY INJURY) (HCC): Status: ACTIVE | Noted: 2024-01-01

## 2024-09-05 PROBLEM — E87.6 HYPOKALEMIA: Status: ACTIVE | Noted: 2024-09-05

## 2024-09-05 PROBLEM — E87.6 HYPOKALEMIA: Status: ACTIVE | Noted: 2024-01-01

## 2024-09-05 PROBLEM — N17.9 AKI (ACUTE KIDNEY INJURY) (HCC): Status: ACTIVE | Noted: 2024-09-05

## 2024-09-05 NOTE — ED PROVIDER NOTES
I reviewed the chart and discussed the case with JOSE A.  I then examined the patient and noted generalized weakness and paresthesias in the setting of not eating and drinking well recently and continuing to take hydrochlorothiazide.  Sodium low at 122, potassium low at 2.2.  IV normal saline and potassium chloride given.  Patient will be admitted.      Electrocardiogram as interpreted by this provider shows normal sinus rhythm, rate 66, normal intervals.  Rate, axis and intervals as noted on the printed ECG report.      I agree with the following clinical impression(s): Weakness, hyponatremia, hypokalemia    I agree with the plan as noted.

## 2024-09-05 NOTE — SPIRITUAL CARE NOTE
Spiritual Care Visit Note    Patient Name: Kathia Grissom Date of Spiritual Care Visit: 24   : 1940 Primary Dx: Hyponatremia       Referred By: Referral From: Nurse    Spiritual Care Taxonomy:    Intended Effects: Convey a calming presence    Methods: Offer support    Interventions: Acknowledge current situation;Active listening;Identify supportive relationship(s);Silent prayer    Visit Type/Summary:     - Spiritual Care: Responded to a request for spiritual care and assessed the patient for spiritual care needs. Consulted with RN prior to visit. Provided information regarding how to contact Spiritual Care and left a Spiritual Care information card.  - PoA: Patient unable to complete PoAH at this time: Vistited patient in response to PoA for Healthcare consult/request. Upon inquiry, identified that the patient may not be able to make decisions related to PoAH at this time. Reviewed the patient's EHR and paper chart. There is no documented PoAH on either. Reviewed Patient's paper chart to determine whether a surrogate decision maker has been identified. A completed surrogate decision maker form could not be located. Contacted Patient's  to let them know that a surrogate decision maker needs to be identified. Provided this update to the patient's nurse.     Spiritual Care support can be requested via an Epic consult.   For urgent/immediate needs, please contact the On Call  at: Pablo: ext [phone]

## 2024-09-05 NOTE — H&P
[facility]     Kathia Grissom Patient Status:  Inpatient    1940 MRN [id number]   Location [facility] 2NE-A Attending Rosemarie Beth MD   Hosp Day # 0 PCP GEOFFREY HAAS MD     Chief Complaint: abdominal burning    Subjective:    History of Present Illness:     Kathia Grissom is a 84 year old female with PMHx RA/ HTN/ T7-8 compression fx who presented to the hospital for multiple falls at home. She reports having frequent falls and has been feeling weak. She notes her legs typically give out on her with her last fall about 4 days ago. She also was complaining of back pain which is sharp and rated 5/10. There are no alleviating or exacerbating factors. The pain she had in her abdomen in the ED has resolved. She reports poor appetite and poor intake of both food and fluids at home.    History/Other:    Past Medical History:  Past Medical History:    Rheumatoid arthritis (HCC)    Unspecified essential hypertension    Uterine prolapse     Past Surgical History:   Past Surgical History:   Procedure Laterality Date    Hysterectomy  2016    Adam/Kaleigh 'complete w/bladder sling      Family History:   Family History   Problem Relation Age of Onset    Hypertension Father     Diabetes Father 40    Heart Disease Father 40    Diabetes Sister 57        Cause of death    Lung Disorder Brother         COPD     Social History:    reports that she has never smoked. She has never used smokeless tobacco. She reports that she does not drink alcohol and does not use drugs.     Allergies: No Known Allergies    Medications:    No current facility-administered medications on file prior to encounter.     Current Outpatient Medications on File Prior to Encounter   Medication Sig Dispense Refill    ibuprofen 200 MG Oral Tab Take 3 tablets (600 mg total) by mouth in the morning and 3 tablets (600 mg total) before bedtime.      folic acid 1 MG Oral Tab Take 1 tablet (1 mg total) by mouth daily. 90  tablet 1    hydroCHLOROthiazide 12.5 MG Oral Cap Take 1 capsule (12.5 mg total) by mouth daily. 90 capsule 0    metoprolol succinate ER 50 MG Oral Tablet 24 Hr Take 1 tablet (50 mg total) by mouth daily. 90 tablet 0    cholecalciferol 25 MCG (1000 UT) Oral Cap Take  by mouth.      lidocaine 5 % External Patch Place 1 patch onto the skin daily. (Patient not taking: Reported on 8/20/2024) 30 each 0    methotrexate 2.5 MG Oral Tab Take 5 tablets (12.5 mg total) by mouth once a week. 60 tablet 0       Review of Systems:   A comprehensive review of systems was completed.    Pertinent positives and negatives noted in the HPI.    Objective:   Physical Exam:    /57   Pulse 83   Temp 97.5 °F (36.4 °C) (Temporal)   Resp 16   Ht 4' 10\" (1.473 m)   Wt 100 lb (45.4 kg)   SpO2 100%   BMI 20.90 kg/m²   General: No acute distress, Alert, frail appearing  Respiratory: No rhonchi, no wheezes, on room air  Cardiovascular: S1, S2. Regular rate and rhythm  Abdomen: Soft, Non-tender, non-distended, positive bowel sounds  Neuro: No new focal deficits  Extremities: No edema, arthritis in bl hands with ulnar deviation    Results:    Labs:      Labs Last 24 Hours:    Recent Labs   Lab 09/04/24  1807   RBC 3.33*   HGB 10.8*   HCT 29.9*   MCV 89.8   MCH 32.4   MCHC 36.1   RDW 14.8   NEPRELIM 5.35   WBC 5.9   .0       Recent Labs   Lab 09/04/24  1807   *   BUN 26*   CREATSERUM 1.68*   EGFRCR 30*   CA 9.3   ALB 3.0*   *   K 2.2*   CL 82*   CO2 33.0*   ALKPHO 75   AST 25   ALT 22   BILT 0.6   TP 6.8       No results found for: \"PT\", \"INR\"    No results for input(s): \"TROP\", \"TROPHS\", \"CK\" in the last 168 hours.    No results for input(s): \"TROP\", \"PBNP\" in the last 168 hours.    No results for input(s): \"PCT\" in the last 168 hours.    Imaging: Imaging data reviewed in Epic.    Assessment & Plan:      #Abdominal burning  -hx T 7-8 compression fx  -suspect neuropathy from compression fx  -cont  brace  -gabapentin    #Failure to thrive  #hypokalemia  #hypomagnesemia  -family reports decreased PO intake and weakness  -K 2.2, Mg 1.3  -PT/OT  -cont to follow lytes and replete prn  -monitor PO intake    #hyponatremia  -Na 122  -suspect 2/2 poor salt intake as well as SIADH from pain/ NSAID use and hydrochlorothiazide use  -IVF with LR@80 mls/h  -q6 H BMP with goal correction 6-8 over 24 hrs    #LENO  -Cr 1.68 with BUN 26: baseline 0.94  -hold home hydrochlorothiazide/ ibuprofen  -cont to monitor BMP  -strict I/O, avoid nephrotoxins    #hypoalbuminemia  -albumin 3.0    #normocytic anemia  -hgb 10.8: baseline 11.9  -no signs active bleeding  -cont to monitor CBC  -cont home folic acid    #HTN  -cont home metoprolol    #RA  -cont home methotrexate      Plan of care discussed with ED physician    Ethel Martinez DO    Supplementary Documentation:     The 21st Century Cures Act makes medical notes like these available to patients in the interest of transparency. Please be advised this is a medical document. Medical documents are intended to carry relevant information, facts as evident, and the clinical opinion of the practitioner. The medical note is intended as peer to peer communication and may appear blunt or direct. It is written in medical language and may contain abbreviations or verbiage that are unfamiliar.               **Certification      PHYSICIAN Certification of Need for Inpatient Hospitalization - Initial Certification    Patient will require inpatient services that will reasonably be expected to span two midnight's based on the clinical documentation in H+P.   Based on patients current state of illness, I anticipate that, after discharge, patient will require TBD.

## 2024-09-05 NOTE — DIETARY NOTE
[facility]   part of [facility]    NUTRITION ASSESSMENT    Unable to diagnose malnutrition criteria at this time.      NUTRITION INTERVENTION:    Meal and Snacks - Monitor and encourage adequate PO intake. Regular diet  Medical Food Supplements - vanilla [email or web address] 1.4 Once and Magic Cup BID chocolate. Rationale/use for oral supplements discussed.      PATIENT STATUS: 09/05/24 84/F admitted d/t complaints of burning sensation on abd, hyponatremia, and FTT. Pt screened d/t MST. It was noted that pt has had frequent falls, feels weak, and eats/drinks very little. Pt reports her appetite being low for months. She lives with her daughter who sometimes make her food. Pt only eats 1x meal/day. Pt has dentures but does not use them. No complaints of N/V/D and refused NFPE. Pt claims she used to weigh 150 lbs, but cannot recall when that was. EMR does not show recent significant wt loss. Pt refused Ensure, but was sent to [employer] to assess if she likes it with chocolate Magic Cup. Suspect pt with malnutrition, however, could not confirm with NFPE at this time d/t pt refusal.    PMH: RA, HTN    ANTHROPOMETRICS:  Ht: 147.3 cm (4' 10\")  Wt: 44.5 kg (98 lb 1.6 oz).   BMI: Body mass index is 20.5 kg/m².  IBW: 43.2 kg      WEIGHT HISTORY:   Weight loss: Per EMR, no significant wt loss. Pt claims she used to weigh 150 lbs, but could not recall when that was.     Wt Readings from Last 10 Encounters:   09/05/24 44.5 kg (98 lb 1.6 oz)   08/12/24 45.4 kg (100 lb)   07/30/24 45.4 kg (100 lb)   06/10/24 45.8 kg (101 lb)   05/21/24 46.7 kg (103 lb)   11/01/23 45.8 kg (101 lb)   08/03/23 46.7 kg (103 lb)   06/05/23 47.2 kg (104 lb)   12/28/22 48.1 kg (106 lb)   03/28/22 53.5 kg (118 lb)        NUTRITION:  Diet:       Procedures    Regular/General diet Is Patient on Accuchecks? No      Food Allergies: No  Cultural/Ethnic/Hindu Preferences Addressed: Yes    Percent Meals Eaten (last 3 days)       None            GI  system review: WNL Last BM: 9/4  Skin and wounds: skin intact    NUTRITION RELATED PHYSICAL FINDINGS:     1. Body Fat/Muscle Mass:  pt refused NFPE, per visual no wasting noticed.     2. Fluid Accumulation: none per RN documentation     NUTRITION PRESCRIPTION: 44.5 kg  Calories: 5600-8842 calories/day (25-30 kcal/kg)  Protein: 45-58 grams protein/day ( 1.0-1.3  grams protein per kg)  Fluid: ~1 ml/kcal or per MD discretion    NUTRITION DIAGNOSIS/PROBLEM:  Predicted suboptimal energy intake related to insufficient appetite resulting in inadequate nutrition intake as evidenced by documented/reported insufficient oral intake and documented/reported unintentional weight loss      MONITOR AND EVALUATE/NUTRITION GOALS:  PO intake of 75% of meals TID - New  PO intake of 75% of oral nutrition supplement/s - New  Weight stable within 1 to 2 lbs during admission - New      MEDICATIONS:  D3, folic acid, Kcl 40 mEq    LABS:  Na: 127, K 3.2, Cl 93    Pt is at High nutrition risk    Raul Jauregui-[place]  Dietetic Intern  [id number]

## 2024-09-05 NOTE — PLAN OF CARE
Patient alert and oriented x 3-4. On RA. Up with x1 w/ walker. NSR on tele. Continent of bowel/bladder. No complaints of pain, shortness of breath, chest pain/discomfort. POC: IVF, PT/OT to see, nutrition to see, monitor labs. Call light within reach. Fall precautions in place.     Problem: Patient/Family Goals  Goal: Patient/Family Long Term Goal  Description: Patient's Long Term Goal: to go home    Interventions:  - consults to see  -PT/OT to see  - See additional Care Plan goals for specific interventions  Outcome: Progressing  Goal: Patient/Family Short Term Goal  Description: Patient's Short Term Goal: to feel better    Interventions:   - IV fluids  -monitor labs  - See additional Care Plan goals for specific interventions  Outcome: Progressing     Problem: CARDIOVASCULAR - ADULT  Goal: Maintains optimal cardiac output and hemodynamic stability  Description: INTERVENTIONS:  - Monitor vital signs, rhythm, and trends  - Monitor for bleeding, hypotension and signs of decreased cardiac output  - Evaluate effectiveness of vasoactive medications to optimize hemodynamic stability  - Monitor arterial and/or venous puncture sites for bleeding and/or hematoma  - Assess quality of pulses, skin color and temperature  - Assess for signs of decreased coronary artery perfusion - ex. Angina  - Evaluate fluid balance, assess for edema, trend weights  Outcome: Progressing  Goal: Absence of cardiac arrhythmias or at baseline  Description: INTERVENTIONS:  - Continuous cardiac monitoring, monitor vital signs, obtain 12 lead EKG if indicated  - Evaluate effectiveness of antiarrhythmic and heart rate control medications as ordered  - Initiate emergency measures for life threatening arrhythmias  - Monitor electrolytes and administer replacement therapy as ordered  Outcome: Progressing     Problem: SAFETY ADULT - FALL  Goal: Free from fall injury  Description: INTERVENTIONS:  - Assess pt frequently for physical needs  - Identify  cognitive and physical deficits and behaviors that affect risk of falls.  - [place] fall precautions as indicated by assessment.  - Educate pt/family on patient safety including physical limitations  - Instruct pt to call for assistance with activity based on assessment  - Modify environment to reduce risk of injury  - Provide assistive devices as appropriate  - Consider OT/PT consult to assist with strengthening/mobility  - Encourage toileting schedule  Outcome: Progressing     Problem: METABOLIC/FLUID AND ELECTROLYTES - ADULT  Goal: Glucose maintained within prescribed range  Description: INTERVENTIONS:  - Monitor Blood Glucose as ordered  - Assess for signs and symptoms of hyperglycemia and hypoglycemia  - Administer ordered medications to maintain glucose within target range  - Assess barriers to adequate nutritional intake and initiate nutrition consult as needed  - Instruct patient on self management of diabetes  Outcome: Progressing  Goal: Electrolytes maintained within normal limits  Description: INTERVENTIONS:  - Monitor labs and rhythm and assess patient for signs and symptoms of electrolyte imbalances  - Administer electrolyte replacement as ordered  - Monitor response to electrolyte replacements, including rhythm and repeat lab results as appropriate  - Fluid restriction as ordered  - Instruct patient on fluid and nutrition restrictions as appropriate  Outcome: Progressing  Goal: Hemodynamic stability and optimal renal function maintained  Description: INTERVENTIONS:  - Monitor labs and assess for signs and symptoms of volume excess or deficit  - Monitor intake, output and patient weight  - Monitor urine specific gravity, serum osmolarity and serum sodium as indicated or ordered  - Monitor response to interventions for patient's volume status, including labs, urine output, blood pressure (other measures as available)  - Encourage oral intake as appropriate  - Instruct patient on fluid and nutrition  restrictions as appropriate  Outcome: Progressing

## 2024-09-05 NOTE — PLAN OF CARE
Patient from home with daughters. Alert and confused. Denies pain. No SOB noted. Vitals stable. Low K, Low MG, replaced in ED. Up with assistance. Admission history and med history with family. Skin intact, CDI. Needs met. Kept clean and dry. IVF. Will continue to monitor.     Problem: CARDIOVASCULAR - ADULT  Goal: Maintains optimal cardiac output and hemodynamic stability  Description: INTERVENTIONS:  - Monitor vital signs, rhythm, and trends  - Monitor for bleeding, hypotension and signs of decreased cardiac output  - Evaluate effectiveness of vasoactive medications to optimize hemodynamic stability  - Monitor arterial and/or venous puncture sites for bleeding and/or hematoma  - Assess quality of pulses, skin color and temperature  - Assess for signs of decreased coronary artery perfusion - ex. Angina  - Evaluate fluid balance, assess for edema, trend weights  Outcome: Progressing  Goal: Absence of cardiac arrhythmias or at baseline  Description: INTERVENTIONS:  - Continuous cardiac monitoring, monitor vital signs, obtain 12 lead EKG if indicated  - Evaluate effectiveness of antiarrhythmic and heart rate control medications as ordered  - Initiate emergency measures for life threatening arrhythmias  - Monitor electrolytes and administer replacement therapy as ordered  Outcome: Progressing     Problem: SAFETY ADULT - FALL  Goal: Free from fall injury  Description: INTERVENTIONS:  - Assess pt frequently for physical needs  - Identify cognitive and physical deficits and behaviors that affect risk of falls.  - [place] fall precautions as indicated by assessment.  - Educate pt/family on patient safety including physical limitations  - Instruct pt to call for assistance with activity based on assessment  - Modify environment to reduce risk of injury  - Provide assistive devices as appropriate  - Consider OT/PT consult to assist with strengthening/mobility  - Encourage toileting schedule  Outcome: Progressing      Problem: METABOLIC/FLUID AND ELECTROLYTES - ADULT  Goal: Glucose maintained within prescribed range  Description: INTERVENTIONS:  - Monitor Blood Glucose as ordered  - Assess for signs and symptoms of hyperglycemia and hypoglycemia  - Administer ordered medications to maintain glucose within target range  - Assess barriers to adequate nutritional intake and initiate nutrition consult as needed  - Instruct patient on self management of diabetes  Outcome: Progressing  Goal: Electrolytes maintained within normal limits  Description: INTERVENTIONS:  - Monitor labs and rhythm and assess patient for signs and symptoms of electrolyte imbalances  - Administer electrolyte replacement as ordered  - Monitor response to electrolyte replacements, including rhythm and repeat lab results as appropriate  - Fluid restriction as ordered  - Instruct patient on fluid and nutrition restrictions as appropriate  Outcome: Progressing  Goal: Hemodynamic stability and optimal renal function maintained  Description: INTERVENTIONS:  - Monitor labs and assess for signs and symptoms of volume excess or deficit  - Monitor intake, output and patient weight  - Monitor urine specific gravity, serum osmolarity and serum sodium as indicated or ordered  - Monitor response to interventions for patient's volume status, including labs, urine output, blood pressure (other measures as available)  - Encourage oral intake as appropriate  - Instruct patient on fluid and nutrition restrictions as appropriate  Outcome: Progressing

## 2024-09-05 NOTE — ED QUICK NOTES
Orders for admission, patient is aware of plan and ready to go upstairs. Any questions, please call ED RN Aneudy at extension [phone].     Patient Covid vaccination status: Unvaccinated     COVID Test Ordered in ED: None    COVID Suspicion at Admission: N/A    Running Infusions:  None    Mental Status/LOC at time of transport: A+OX4    Other pertinent information:   CIWA score: N/A   NIH score:  N/A

## 2024-09-05 NOTE — PHYSICAL THERAPY NOTE
PT orders received and chart reviewed. Pt has back brace for spine compression fractures. This brace not present. Pt and family aware to bring brace from home. Will follow and re-attempt as able and appropriate.

## 2024-09-06 NOTE — OCCUPATIONAL THERAPY NOTE
OCCUPATIONAL THERAPY EVALUATION - INPATIENT     Room Number: 2617/2617-A  Evaluation Date: 9/6/2024  Type of Evaluation: Initial  Presenting Problem: Falls, last fall about 4 days prior to admission, abdominal burning    Physician Order: IP Consult to Occupational Therapy  Reason for Therapy: ADL/IADL Dysfunction and Discharge Planning    OCCUPATIONAL THERAPY ASSESSMENT   Patient is currently functioning near baseline with toileting, bathing, and lower body dressing. Prior to admission, patient's baseline is modified independent.  Patient is requiring minimum assistance, CGA as a result of the following impairments: impaired standing balance. Occupational Therapy will continue to follow for duration of hospitalization.    Patient will benefit from continued skilled OT Services at discharge to promote prior level of function and safety with additional support and return home with home health OT      History Related to Current Admission: Patient is a 84 year old female admitted on 9/4/2024 with Presenting Problem: Falls, last fall about 4 days prior to admission, abdominal burning. Co-Morbidities : rheumatoid arthritis, hypertension, hx T7/T8.     **PT communicated with MD using Epic messaging system on 9/6/24. Ok to be up without brace. Per family and chart, pt has not been wearing it at home.     Recent visits:  8/12/24 ER visit for R and L rib pain  7/31/24 ER visit for back pain, compression fractures T7-8    WEIGHT BEARING RESTRICTION  Weight Bearing Restriction: None                Recommendations for nursing staff:   Transfers: cga  Toileting location: toilet    EVALUATION SESSION:  Patient Start of Session: supine  FUNCTIONAL TRANSFER ASSESSMENT  Sit to Stand: Edge of Bed  Edge of Bed: Contact Guard Assist    BED MOBILITY  Supine to Sit : Minimal Assist    COGNITION  Following Commands:  follows one step commands with increased time and follows one step commands with repetition  Initiation: hand over hand to  initiate tasks    Upper Extremity   ROM: within functional limits, both hands with RW ROM limitations  Strength: within functional limits     EDUCATION PROVIDED  Patient: Plan of Care; Role of Occupational Therapy; Functional Transfer Techniques; Fall Prevention; Posture/Positioning  Patient's Response to Education: Requires Further Education    Equipment used: rw    Therapist comments: family present during the session. Log rolling method and spine precautions education complete.   Min A with PT, supine to sit with HOB slightly elevated. Pt ambulated with RW, CGA. Per family, pt walks limited distance, from bedroom to bathroom with RW.  Discussed possible home equipment needs. Daughter is planning to install grab bars for shower.      Patient End of Session: Up in chair;Needs met;Call light within reach;RN aware of session/findings;All patient questions and concerns addressed;Family present    OCCUPATIONAL PROFILE    HOME SITUATION  Type of Home: House  Home Layout: Multi-level  Lives With: Family (daughter and son)    Toilet and Equipment: Standard height toilet  Shower/Tub and Equipment: Walk-in shower (will get grab bar)  Other Equipment:  (rw)          Drives: No  Patient Regularly Uses: Glasses    Prior Level of Function: pt lives with her daughter and son. Someone is with the patient at all times. Uses RW. Ambulates limited distance, bedroom to bathroom.  Family could assist with ADL as needed.       PAIN ASSESSMENT  Ratin          OBJECTIVE  Precautions: Spine  Fall Risk: High fall risk      ASSESSMENTS    AM-PAC ‘6-Clicks’ Inpatient Daily Activity Short Form  -   Putting on and taking off regular lower body clothing?: A Little  -   Bathing (including washing, rinsing, drying)?: A Little  -   Toileting, which includes using toilet, bedpan or urinal? : A Little  -   Putting on and taking off regular upper body clothing?: A Little  -   Taking care of personal grooming such as brushing teeth?: None  -    Eating meals?: None    AM-PAC Score:  Score: 20  Approx Degree of Impairment: 38.32%  Standardized Score (AM-PAC Scale): 42.03    ADDITIONAL TESTS     NEUROLOGICAL FINDINGS      COGNITION ASSESSMENTS       PLAN  OT Treatment Plan: Energy conservation/work simplification techniques;Balance activities;ADL training;Functional transfer training;UE strengthening/ROM;Patient/Family education;Patient/Family training;Equipment eval/education;Compensatory technique education  Rehab Potential : Fair  Frequency: 3x/week  Number of Visits to Meet Established Goals: 3    ADL Goals   Patient will perform lower body dressing:  with supervision  Patient will perform toileting: with supervision    Functional Transfer Goals  Patient will transfer to toilet:  with supervision    Additional Goals  Pt and family would recall at least 3 home safety recommendation ideas.    Patient Evaluation Complexity Level:   Occupational Profile/Medical History LOW - Brief history including review of medical or therapy records    Specific performance deficits impacting engagement in ADL/IADL LOW  1 - 3 performance deficits    Client Assessment/Performance Deficits MODERATE - Comorbidities and min to mod modifications of tasks    Clinical Decision Making LOW - Analysis of occupational profile, problem-focused assessments, limited treatment options    Overall Complexity LOW     OT Session Time: 20 minutes  Self-Care Home Management: 8 minutes  Therapeutic Activity: 5 minutes

## 2024-09-06 NOTE — CM/SW NOTE
09/06/24 1500   CM/SW Referral Data   Referral Source Social Work (self-referral)   Reason for Referral Discharge planning   Informant EMR;Clinical Staff Member;Patient   Medical Hx   Does patient have an established PCP? Yes   Patient Info   Patient's Current Mental Status at Time of Assessment Alert;Oriented   Patient's Home Environment House   Patient lives with Daughter   Patient Status Prior to Admission   Independent with ADLs and Mobility Yes   Discharge Needs   Anticipated D/C needs Home health care   Choice of Post-Acute Provider   Informed patient of right to choose their preferred provider Yes   List of appropriate post-acute services provided to patient/family with quality data Yes   Information given to Patient;Daughter     HOME SITUATION per PT eval  Type of Home: House   Home Layout: Multi-level  Stairs to Enter : 2  Stairs to Bedroom: 9  Railing: Yes     Lives With: Family (daughter and son)  Drives: No  Patient Owned Equipment: Rolling walker  Patient Regularly Uses: Glasses     Prior Level of [place] per PT eval: Pt's dtr reports that pt lives with dtr and son in a house. Pt reports that pt is able to ambulate using the RW. Pt is IND with ADLs. Per EMR, pt had a fall that caused pt to have a T7/T8 compression fx. Pt was given a TLSO brace. Pt's family reports that pt rarely wears the brace.    Patient is an 85 y/o female admitted due to hyponatremia. Anticipated therapy need for pt at DC is HH. Referral was sent and options list was obtained.    ERIKA met with pt at bedside to discuss HH services. Pt reported never having used HH services in the past, but is agreeable with getting services after DC. ERIKA provided her with options list and encouraged her to review list and make a choice. ERIKA stated she would also reach out to pt's daughter to discuss DC plan. Pt agreeable with this.    ERIKA called pt's daughter, Allyssa ([phone]), and discussed HH services. Allyssa is agreeable with HH and  requested for ERIKA to email the list to her as well so she can review it. Allyssa stated she would notify ERIKA when choice was made.     ERIKA emailed Allyssa the list ([email or web address]). awaiting choice.     to remain available for support and/or discharge planning.    Addendum: ERIKA called Allyssa ([phone]) to follow up regarding HH choice, but was unable to get a hold of her. ERIKA left  requesting a call back.  ERIKA also attempted to meet with pt, but pt was asleep.    JESSIE Salmeron  Discharge Planner  [phone]

## 2024-09-06 NOTE — PLAN OF CARE
Patient alert and oriented x 3. On RA. Up with x1 w/ walker. NSR on tele. Incontinent of bowel/bladder. No complaints of pain, shortness of breath, chest pain/discomfort. POC: IVF, PT/OT to see, possible dc today. Call light within reach. Fall precautions in place.     Problem: Patient/Family Goals  Goal: Patient/Family Long Term Goal  Description: Patient's Long Term Goal: to go home    Interventions:  - consults to see  -PT/OT to see  - See additional Care Plan goals for specific interventions  Outcome: Progressing  Goal: Patient/Family Short Term Goal  Description: Patient's Short Term Goal: to feel better    Interventions:   - IV fluids  -monitor labs  - See additional Care Plan goals for specific interventions  Outcome: Progressing     Problem: CARDIOVASCULAR - ADULT  Goal: Maintains optimal cardiac output and hemodynamic stability  Description: INTERVENTIONS:  - Monitor vital signs, rhythm, and trends  - Monitor for bleeding, hypotension and signs of decreased cardiac output  - Evaluate effectiveness of vasoactive medications to optimize hemodynamic stability  - Monitor arterial and/or venous puncture sites for bleeding and/or hematoma  - Assess quality of pulses, skin color and temperature  - Assess for signs of decreased coronary artery perfusion - ex. Angina  - Evaluate fluid balance, assess for edema, trend weights  Outcome: Progressing  Goal: Absence of cardiac arrhythmias or at baseline  Description: INTERVENTIONS:  - Continuous cardiac monitoring, monitor vital signs, obtain 12 lead EKG if indicated  - Evaluate effectiveness of antiarrhythmic and heart rate control medications as ordered  - Initiate emergency measures for life threatening arrhythmias  - Monitor electrolytes and administer replacement therapy as ordered  Outcome: Progressing     Problem: SAFETY ADULT - FALL  Goal: Free from fall injury  Description: INTERVENTIONS:  - Assess pt frequently for physical needs  - Identify cognitive and  physical deficits and behaviors that affect risk of falls.  - [place] fall precautions as indicated by assessment.  - Educate pt/family on patient safety including physical limitations  - Instruct pt to call for assistance with activity based on assessment  - Modify environment to reduce risk of injury  - Provide assistive devices as appropriate  - Consider OT/PT consult to assist with strengthening/mobility  - Encourage toileting schedule  Outcome: Progressing     Problem: METABOLIC/FLUID AND ELECTROLYTES - ADULT  Goal: Glucose maintained within prescribed range  Description: INTERVENTIONS:  - Monitor Blood Glucose as ordered  - Assess for signs and symptoms of hyperglycemia and hypoglycemia  - Administer ordered medications to maintain glucose within target range  - Assess barriers to adequate nutritional intake and initiate nutrition consult as needed  - Instruct patient on self management of diabetes  Outcome: Progressing  Goal: Electrolytes maintained within normal limits  Description: INTERVENTIONS:  - Monitor labs and rhythm and assess patient for signs and symptoms of electrolyte imbalances  - Administer electrolyte replacement as ordered  - Monitor response to electrolyte replacements, including rhythm and repeat lab results as appropriate  - Fluid restriction as ordered  - Instruct patient on fluid and nutrition restrictions as appropriate  Outcome: Progressing  Goal: Hemodynamic stability and optimal renal function maintained  Description: INTERVENTIONS:  - Monitor labs and assess for signs and symptoms of volume excess or deficit  - Monitor intake, output and patient weight  - Monitor urine specific gravity, serum osmolarity and serum sodium as indicated or ordered  - Monitor response to interventions for patient's volume status, including labs, urine output, blood pressure (other measures as available)  - Encourage oral intake as appropriate  - Instruct patient on fluid and nutrition restrictions as  appropriate  Outcome: Progressing

## 2024-09-06 NOTE — DISCHARGE SUMMARY
[place] [facility]  DISCHARGE SUMMARY     Kathia Grissom Patient Status:  Inpatient    1940 MRN [id number]   Location [facility] 2NE-A Attending Anne Stephens,    Hosp Day # 1 PCP GEOFFREY HAAS MD     Date of Admission: 2024  Date of Discharge:   2024    Discharge Disposition: Home or Self Care    Discharge Diagnosis:  LENO  Hyponatremia  Dehydration   UTI    History of Present Illness:      Kathia Grissom is a 84 year old female with PMHx RA/ HTN/ T7-8 compression fx who presented to the hospital for multiple falls at home. She reports having frequent falls and has been feeling weak. She notes her legs typically give out on her with her last fall about 4 days ago. She also was complaining of back pain which is sharp and rated 5/10. There are no alleviating or exacerbating factors. The pain she had in her abdomen in the ED has resolved. She reports poor appetite and poor intake of both food and fluids at home.       Brief Synopsis: Patient was admitted, found to have urinary tract infection, only low-grade fever, symptomatically improved with IV fluids and antibiotics, sodium with appropriate trend and correction towards normal, advised to remain off hydrochlorothiazide and avoid NSAIDs moving forward, patient being discharged home in stable condition.    Lace+ Score: 69  59-90 High Risk  29-58 Medium Risk  0-28   Low Risk       TCM Follow-Up Recommendation:  LACE > 58: High Risk of readmission after discharge from the hospital.  **Certification    Admission date was 2024.  Inpatient stay was shorter than expected.  Patient's Hyponatremia was initially serious enough to expect a more lengthy hospitalization but patient improved faster than expected.                 Procedures during hospitalization:   N/a    Incidental or significant findings and recommendations (brief descriptions):  N/a    Lab/Test results pending at Discharge:   Urine Culture    Consultants:  N/a    Discharge Medication  List:     Discharge Medications        START taking these medications        Instructions Prescription details   cephALEXin 500 MG Caps  Commonly known as: Keflex      Take 1 capsule (500 mg total) by mouth 3 (three) times daily for 5 days.   Stop taking on: September 11, 2024  Quantity: 15 capsule  Refills: 0            CONTINUE taking these medications        Instructions Prescription details   cholecalciferol 25 MCG (1000 UT) Caps  Commonly known as: DRISDOL      Take  by mouth.   Refills: 0     folic acid 1 MG Tabs  Commonly known as: Folvite      Take 1 tablet (1 mg total) by mouth daily.   Quantity: 90 tablet  Refills: 1     methotrexate 2.5 MG Tabs  Commonly known as: Rheumatrex      Take 5 tablets (12.5 mg total) by mouth once a week.   Quantity: 60 tablet  Refills: 0     metoprolol succinate ER 50 MG Tb24  Commonly known as: Toprol XL      Take 1 tablet (50 mg total) by mouth daily.   Quantity: 90 tablet  Refills: 0            STOP taking these medications      hydroCHLOROthiazide 12.5 MG Caps        ibuprofen 200 MG Tabs  Commonly known as: Motrin                  Where to Get Your Medications        These medications were sent to [id number] - [place], [place] - [address] [place] [address] AT [facility] [address] [phone], [phone]  [address] [place] [address]      Phone: [phone]   cephALEXin 500 MG Caps         [place] reviewed[place] n/a    Follow-up appointment:   Chapis Eller MD  [address] [place] [id number] [phone]    Schedule an appointment as soon as possible for a visit in 1 week(s)      Appointments for Next 30 Days 9/6/2024 - 10/6/2024        Date Arrival Time Visit Type Length Department Provider     10/1/2024  5:45 PM  [facility] MRI SPNE THORACIC WO [[id number]] 45 min [facility] MRI [facility] (1.5T)    Patient Instructions:     You may be subject to a fee if you do not show up for your appointment or you cancel within 24  hours of your appointment.    Please arrive 30 minutes prior to your scheduled appointment time.  You will need time to change your clothes, fill out screening forms, use the restroom, and may need an IV if your exam requires contrast.  If you arrive too late, your appointment may need to be rescheduled.    IF YOU REQUIRE ORAL SEDATION FOR YOUR MRI: Your physician is responsible for giving you a prescription for oral medication which you would fill at your local pharmacy. If you will be taking oral sedation, you must bring a  who will drive you home (the  does not necessarily have to stay throughout the procedure).        Location Instructions:     You may be subject to a fee if you do not show up for your appointment or you cancel within 24 hours of your appointment.  Your appointment will be at the [facility] located at [address] [place] [address]. The facility is located 1/2 mile west of [address] on [address] at the corner of [address] [place] [address] and [address]. Your test is in Building A, which is also labeled as the Emergency or Outpatient building. Please park in the Red Lot and follow the posted signs for guidance. The telephone number is [phone].  Because of the nature of the Emergency Department, please be advised of the possibility your appointment may be delayed at this location.  Due to safety reasons you will be required to change into a gown. You will be asked to remove all metallic items, such as watches, jewelry, hairpins, eyeglasses, hearing aids, and continuous glucose monitoring devices. Your purse, wallet or other personal items will remain in a secure locker or with your family during your exam.  Please bring your insurance card and photo ID. You will also need to bring your doctor's order unless your physician's office submitted the order electronically or faxed the order. Without the order, your test may be delayed or postponed.  Children: Children  under the age of 12 must have another adult caregiver with them.&nbsp; Please do not bring your child/children without a caregiver.&nbsp; Because of the highly sensitive equipment and privacy of all our patients, children will not be permitted in the exam rooms, unless otherwise noted and in accordance with departmental policy.  PATIENT RESPONSIBILITY ESTIMATE  - (Estimate) We will provide you with your estimated remaining deductible and coinsurance balance for your services at the time of check in.  - (Payment) Please be aware that you may be asked for payment at the time of service.  Masks are optional for all patients and visitors, unless otherwise indicated.               10/2/2024  5:45 PM  [facility] MRI LUMBAR WO [[id number]] 45 min [facility] MRI RM1 (1.5T)    Patient Instructions:     You may be subject to a fee if you do not show up for your appointment or you cancel within 24 hours of your appointment.    Please arrive 30 minutes prior to your scheduled appointment time.  You will need time to change your clothes, fill out screening forms, use the restroom, and may need an IV if your exam requires contrast.  If you arrive too late, your appointment may need to be rescheduled.    IF YOU REQUIRE ORAL SEDATION FOR YOUR MRI: Your physician is responsible for giving you a prescription for oral medication which you would fill at your local pharmacy. If you will be taking oral sedation, you must bring a  who will drive you home (the  does not necessarily have to stay throughout the procedure).        Location Instructions:     You may be subject to a fee if you do not show up for your appointment or you cancel within 24 hours of your appointment.  Your appointment will be at the [facility] located at [address] [place] [address]. The facility is located 1/2 mile west [address] on [address] at the corner of [address]. Your test is in  Building A, which is also labeled as the Emergency or Outpatient building. Please park in the Red Lot and follow the posted signs for guidance. The telephone number is [phone].  Because of the nature of the Emergency Department, please be advised of the possibility your appointment may be delayed at this location.  Due to safety reasons you will be required to change into a gown. You will be asked to remove all metallic items, such as watches, jewelry, hairpins, eyeglasses, hearing aids, and continuous glucose monitoring devices. Your purse, wallet or other personal items will remain in a secure locker or with your family during your exam.  Please bring your insurance card and photo ID. You will also need to bring your doctor's order unless your physician's office submitted the order electronically or faxed the order. Without the order, your test may be delayed or postponed.  Children: Children under the age of 12 must have another adult caregiver with them.&nbsp; Please do not bring your child/children without a caregiver.&nbsp; Because of the highly sensitive equipment and privacy of all our patients, children will not be permitted in the exam rooms, unless otherwise noted and in accordance with departmental policy.  PATIENT RESPONSIBILITY ESTIMATE  - (Estimate) We will provide you with your estimated remaining deductible and coinsurance balance for your services at the time of check in.  - (Payment) Please be aware that you may be asked for payment at the time of service.  Masks are optional for all patients and visitors, unless otherwise indicated.                      Vital signs:  Temp:  [98.2 °F (36.8 °C)-99.8 °F (37.7 °C)] 99.8 °F (37.7 °C)  Pulse:  [73-90] 78  Resp:  [16-18] 16  BP: ()/(46-94) 109/83  SpO2:  [92 %-96 %] 92 %    Physical Exam:    General: No acute distress   Lungs: clear to auscultation  Cardiovascular: S1, S2  Abdomen:  Soft      -----------------------------------------------------------------------------------------------  PATIENT DISCHARGE INSTRUCTIONS: See electronic chart    Anne Stephens DO    Total time spent on discharge plannin minutes     The  [place] Cures Act makes medical notes like these available to patients in the interest of transparency. Please be advised this is a medical document. Medical documents are intended to carry relevant information, facts as evident, and the clinical opinion of the practitioner. The medical note is intended as peer to peer communication and may appear blunt or direct. It is written in medical language and may contain abbreviations or verbiage that are unfamiliar.

## 2024-09-06 NOTE — OCCUPATIONAL THERAPY NOTE
OT evaluation order follow up. Per PT, still waiting for back brace. Therapy will evaluated the patient once the brace arrives.

## 2024-09-06 NOTE — PHYSICAL THERAPY NOTE
PHYSICAL THERAPY EVALUATION - INPATIENT     Room Number: 2617/2617-A  Evaluation Date: 9/6/2024  Type of Evaluation: Initial  Physician Order: PT Eval and Treat    Presenting Problem: Hyponatremia  Co-Morbidities : rheumatoid arthritis, hypertension  Reason for Therapy: Mobility Dysfunction and Discharge Planning    PHYSICAL THERAPY ASSESSMENT   Patient is currently functioning near baseline with bed mobility, transfers, and gait.  Prior to admission, patient's baseline is IND.  Patient is requiring contact guard assist as a result of the following impairments: decreased endurance/aerobic capacity, impaired gait balance, and decreased muscular endurance. Physical Therapy will continue to follow for duration of hospitalization.      Patient will benefit from continued skilled PT Services at discharge to promote prior level of function and safety with additional support and return home with home health PT.    PLAN  PT Treatment Plan: Bed mobility;Body mechanics;Endurance;Family education;Gait training;Strengthening;Stair training;Transfer training;Balance training  Rehab Potential : Good  Frequency (Obs): 3-5x/week  Number of Visits to Meet Established Goals: 3      CURRENT GOALS    Goal #1 Patient is able to demonstrate supine - sit EOB @ level: supervision     Goal #2 Patient is able to demonstrate transfers EOB to/from C at assistance level: supervision     Goal #3 Patient is able to ambulate 50 feet with assist device: walker - rolling at assistance level: supervision     Goal #4 Pt will be able to perform 4 steps with bilateral railings.   Goal #5    Goal #6    Goal Comments: Goals established on 9/6/2024      PHYSICAL THERAPY MEDICAL/SOCIAL HISTORY  History related to current admission: Patient is a 84 year old female admitted on 9/4/2024 from Home for Hyponatremia.      HOME SITUATION  Type of Home: House   Home Layout: Multi-level  Stairs to Enter : 2     Stairs to Bedroom: 9  Railing: Yes    Lives With:  Family (daughter and son)  Drives: No  Patient Owned Equipment: Rolling walker  Patient Regularly Uses: Glasses    Prior Level of [place]: Pt's dtr reports that pt lives with dtr and son in a house. Pt reports that pt is able to ambulate using the RW. Pt is IND with ADLs. Per EMR, pt had a fall that caused pt to have a T7/T8 compression fx. Pt was given a TLSO brace. Pt's family reports that pt rarely wears the brace.    SUBJECTIVE  \"Yeah no pain\"      OBJECTIVE  Precautions: None  Fall Risk: High fall risk    WEIGHT BEARING RESTRICTION  Weight Bearing Restriction: None                PAIN ASSESSMENT  Ratin  Location: Pt reports no pain       COGNITION  Overall Cognitive Status:  WFL - within functional limits    RANGE OF MOTION AND STRENGTH ASSESSMENT  Upper extremity ROM and strength are within functional limits     Lower extremity ROM is within functional limits     Lower extremity strength is within functional limits       BALANCE  Static Sitting: Fair +  Dynamic Sitting: Fair +  Static Standing: Fair  Dynamic Standing: Fair -    ADDITIONAL TESTS                                    ACTIVITY TOLERANCE                         O2 WALK       NEUROLOGICAL FINDINGS                        AM-PAC '6-Clicks' INPATIENT SHORT FORM - BASIC MOBILITY  How much difficulty does the patient currently have...  Patient Difficulty: Turning over in bed (including adjusting bedclothes, sheets and blankets)?: A Little   Patient Difficulty: Sitting down on and standing up from a chair with arms (e.g., wheelchair, bedside commode, etc.): A Little   Patient Difficulty: Moving from lying on back to sitting on the side of the bed?: A Little   How much help from another person does the patient currently need...   Help from Another: Moving to and from a bed to a chair (including a wheelchair)?: A Little   Help from Another: Need to walk in hospital room?: A Little   Help from Another: Climbing 3-5 steps with a railing?: A Little        AM-PAC Score:  Raw Score: 18   Approx Degree of Impairment: 46.58%   Standardized Score (AM-PAC Scale): 43.63   CMS Modifier (G-Code): CK    FUNCTIONAL ABILITY STATUS  Gait Assessment   Functional Mobility/Gait Assessment  Gait Assistance: Contact guard assist  Distance (ft): 50  Assistive Device: Rolling walker  Pattern: Shuffle  Stairs: Stairs  How Many Stairs: 2  Device: 1 Rail  Assist: Contact guard assist  Pattern: Ascend and Descend  Ascend and Descend : Step to    Skilled Therapy Provided     Bed Mobility:  Rolling: CGA  Supine to sit: CGA    Sit to supine: NT     Transfer Mobility:  Sit to stand: CGA   Stand to sit: CGA  Gait = CGA 50ft using RW    Therapist's Comments: Reached on attending on guidance from the use the TLSO, Brace, Physician was agreeable for no need of TLSO brace as pt's EMR reported for the use of the brace for relief. Pt was observed to have a slower activity speed and required extra time to complete.     Exercise/Education Provided:  Bed mobility  Body mechanics  Gait training  Transfer training    Patient End of Session: Up in chair;Needs met;Call light within reach;All patient questions and concerns addressed;RN aware of session/findings;Family present      Patient Evaluation Complexity Level:  History Moderate - 1 or 2 personal factors and/or co-morbidities   Examination of body systems Low -  addressing 1-2 elements   Clinical Presentation Low- Stable   Clinical Decision Making Low Complexity       PT Session Time: 23 minutes  Therapeutic Activity: 15 minutes

## 2024-09-06 NOTE — CM/SW NOTE
Pt discussed in rounds and chart was reviewed. Anticipated therapy need for pt at DC is home health.    SW sent referral in Aidin. Awaiting responses. Will provide pt with options list when available.     to remain available for support and/or discharge planning.    JESSIE Salmeron  Discharge Planner  [phone]

## 2024-09-06 NOTE — PLAN OF CARE
Pt alert and confused. No acute events overnight. Vitals stable. Room air. Needs met. Kept clean and dry.     Problem: CARDIOVASCULAR - ADULT  Goal: Maintains optimal cardiac output and hemodynamic stability  Description: INTERVENTIONS:  - Monitor vital signs, rhythm, and trends  - Monitor for bleeding, hypotension and signs of decreased cardiac output  - Evaluate effectiveness of vasoactive medications to optimize hemodynamic stability  - Monitor arterial and/or venous puncture sites for bleeding and/or hematoma  - Assess quality of pulses, skin color and temperature  - Assess for signs of decreased coronary artery perfusion - ex. Angina  - Evaluate fluid balance, assess for edema, trend weights  9/5/2024 2108 by Stefania Porter, RN  Outcome: Progressing  9/5/2024 0748 by Stefania Porter RN  Outcome: Progressing  Goal: Absence of cardiac arrhythmias or at baseline  Description: INTERVENTIONS:  - Continuous cardiac monitoring, monitor vital signs, obtain 12 lead EKG if indicated  - Evaluate effectiveness of antiarrhythmic and heart rate control medications as ordered  - Initiate emergency measures for life threatening arrhythmias  - Monitor electrolytes and administer replacement therapy as ordered  9/5/2024 2108 by Stefania Porter RN  Outcome: Progressing  9/5/2024 0748 by Stefania Porter RN  Outcome: Progressing     Problem: SAFETY ADULT - FALL  Goal: Free from fall injury  Description: INTERVENTIONS:  - Assess pt frequently for physical needs  - Identify cognitive and physical deficits and behaviors that affect risk of falls.  - [place] fall precautions as indicated by assessment.  - Educate pt/family on patient safety including physical limitations  - Instruct pt to call for assistance with activity based on assessment  - Modify environment to reduce risk of injury  - Provide assistive devices as appropriate  - Consider OT/PT consult to assist with strengthening/mobility  - Encourage toileting schedule  9/5/2024  2108 by German, Mansah, RN  Outcome: Progressing  9/5/2024 0748 by Stefania Porter RN  Outcome: Progressing     Problem: METABOLIC/FLUID AND ELECTROLYTES - ADULT  Goal: Glucose maintained within prescribed range  Description: INTERVENTIONS:  - Monitor Blood Glucose as ordered  - Assess for signs and symptoms of hyperglycemia and hypoglycemia  - Administer ordered medications to maintain glucose within target range  - Assess barriers to adequate nutritional intake and initiate nutrition consult as needed  - Instruct patient on self management of diabetes  9/5/2024 2108 by Stefania Porter RN  Outcome: Progressing  9/5/2024 0748 by Stefania Porter RN  Outcome: Progressing  Goal: Electrolytes maintained within normal limits  Description: INTERVENTIONS:  - Monitor labs and rhythm and assess patient for signs and symptoms of electrolyte imbalances  - Administer electrolyte replacement as ordered  - Monitor response to electrolyte replacements, including rhythm and repeat lab results as appropriate  - Fluid restriction as ordered  - Instruct patient on fluid and nutrition restrictions as appropriate  9/5/2024 2108 by Stefania Porter RN  Outcome: Progressing  9/5/2024 0748 by Stefania Porter RN  Outcome: Progressing  Goal: Hemodynamic stability and optimal renal function maintained  Description: INTERVENTIONS:  - Monitor labs and assess for signs and symptoms of volume excess or deficit  - Monitor intake, output and patient weight  - Monitor urine specific gravity, serum osmolarity and serum sodium as indicated or ordered  - Monitor response to interventions for patient's volume status, including labs, urine output, blood pressure (other measures as available)  - Encourage oral intake as appropriate  - Instruct patient on fluid and nutrition restrictions as appropriate  9/5/2024 2108 by Stefania Porter RN  Outcome: Progressing  9/5/2024 0748 by Stefania Porter RN  Outcome: Progressing

## 2024-09-06 NOTE — SPIRITUAL CARE NOTE
Spiritual Care Visit Note    Patient Name: Kathia Grissom Date of Spiritual Care Visit: 24   : 1940 Primary Dx: Hyponatremia       Referred By: Referral From: Family    Spiritual Care Taxonomy:    Intended Effects: Convey a calming presence    Methods: Collaborate with care team member    Interventions: Assist someone with Advance Directives    Visit Type/Summary:  Responded to consult.  RN indicated that patient could make decisions related to PoA at this time.  Patient was given original form and a copy was placed on the chart.     - PoA: New PoAH Created:  remains available for follow up.    Spiritual Care support can be requested via an [facility] consult. For urgent/immediate needs, please contact the On Call  at: [place]: ext [phone]

## 2024-09-07 NOTE — PROGRESS NOTES
NURSING DISCHARGE NOTE    Discharged Home via Wheelchair.  Accompanied by Support staff  Belongings Taken by patient/family.    All upcoming appointments reviewed with and understood by patient and patient's daughter, Allyssa. All medications reviewed with and understood by patient and patient's daughter, Allyssa. All questions answered at this time. Patient left unit with no signs of distress.

## 2024-09-09 ENCOUNTER — PATIENT OUTREACH (OUTPATIENT)
Dept: CASE MANAGEMENT | Age: 84
End: 2024-09-09

## 2024-09-09 NOTE — PROGRESS NOTES
NCM attempted to reach the patient to complete a TCM/HFU call. The patient's voicemail box has not been set up yet so unable to leave a message to call back. NCM to follow up at a later time.

## 2024-09-09 NOTE — CM/SW NOTE
SW received a VM from pt's daughter, Allyssa, informing SW that they reviewed the list and chose [facility].    SW reserved [facility] in Aidin. SW sent them a message informing them that pt already discharged and requested for them to reach out to pt/family to schedule first visit. ERIKA also uploaded pt's AVS to Aidin referral.    Micaela Goncalves LETICIA  Discharge Planner  [phone]

## 2024-09-10 RX ORDER — METHOTREXATE 2.5 MG/1
12.5 TABLET ORAL WEEKLY
Qty: 60 TABLET | Refills: 1 | Status: SHIPPED | OUTPATIENT
Start: 2024-09-10

## 2024-09-10 NOTE — TELEPHONE ENCOUNTER
Requested Prescriptions     Pending Prescriptions Disp Refills    methotrexate 2.5 MG Oral Tab 60 tablet 0     Sig: Take 5 tablets (12.5 mg total) by mouth once a week.     Future Appointments   Date Time Provider Department Center   9/11/2024  6:15 PM Chapis Eller MD [id number]  [place] [facility]   10/1/2024  5:45 PM PF MRI RM1 (1.5T) PF [facility]   10/2/2024  5:45 PM PF MRI RM1 (1.5T) PF [facility] [place]     LOV: 5/21/24   Last Refilled:5/21/25 #60 0RF   Labs:  Component      Latest Ref Rng 5/30/2024   WBC      4.0 - 11.0 x10(3) uL 6.3    RBC      3.80 - 5.30 x10(6)uL 3.70 (L)    Hemoglobin      12.0 - 16.0 g/dL 11.9 (L)    Hematocrit      35.0 - 48.0 % 34.4 (L)    Platelet Count      150.0 - 450.0 10(3)uL 202.0    MCV      80.0 - 100.0 fL 93.0    MCH      26.0 - 34.0 pg 32.2    MCHC      31.0 - 37.0 g/dL 34.6    RDW      % 13.2    Prelim Neutrophil Abs      1.50 - 7.70 x10 (3) uL 4.55    Neutrophils Absolute      1.50 - 7.70 x10(3) uL 4.55    Lymphocytes Absolute      1.00 - 4.00 x10(3) uL 1.13    Monocytes Absolute      0.10 - 1.00 x10(3) uL 0.37    Eosinophils Absolute      0.00 - 0.70 x10(3) uL 0.16    Basophils Absolute      0.00 - 0.20 x10(3) uL 0.03    Immature Granulocyte Absolute      0.00 - 1.00 x10(3) uL 0.01    Neutrophils %      % 72.7    Lymphocytes %      % 18.1    Monocytes %      % 5.9    Eosinophils %      % 2.6    Basophils %      % 0.5    Immature Granulocyte %      % 0.2    CREATININE      0.55 - 1.02 mg/dL 0.94    EGFR      >=60 mL/min/1.73m2 61    SED RATE      0 - 30 mm/Hr 20    C-REACTIVE PROTEIN      <0.30 mg/dL <0.29    AST (SGOT)      15 - 37 U/L 29     20    Albumin      3.4 - 5.0 g/dL 3.8       Legend:  (L) Low    ASSESSMENT/PLAN:      Seropositive RA (+RF and CCP)- stable  - She has 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  States that her symptoms are stable with no stiffness or swelling  - At times she will take Advil 3 pills in the morning and  2 pills at night due to some stiffness and bone pain.  Last visit advised to stop the Advil and take Tylenol arthritis but she state that the Tylenol does not work.  Recent kidney and liver test were normal  - Recommended to continue methotrexate 5 pills weekly and folic acid daily.  - Advised to get blood work this week, she states she cannot do it today but will get it done this week. Last blood work was in June 2023     Pt will f/u in 6 mos       Daniela Dash MD  5/21/2024   5:53 PM

## 2024-09-11 ENCOUNTER — OFFICE VISIT (OUTPATIENT)
Dept: INTERNAL MEDICINE CLINIC | Facility: CLINIC | Age: 84
End: 2024-09-11

## 2024-09-11 ENCOUNTER — TELEPHONE (OUTPATIENT)
Dept: INTERNAL MEDICINE CLINIC | Facility: CLINIC | Age: 84
End: 2024-09-11

## 2024-09-11 VITALS
HEART RATE: 88 BPM | TEMPERATURE: 98 F | HEIGHT: 58 IN | OXYGEN SATURATION: 96 % | SYSTOLIC BLOOD PRESSURE: 135 MMHG | BODY MASS INDEX: 21.2 KG/M2 | DIASTOLIC BLOOD PRESSURE: 80 MMHG | WEIGHT: 101 LBS

## 2024-09-11 DIAGNOSIS — E87.6 HYPOKALEMIA: Primary | ICD-10-CM

## 2024-09-11 PROCEDURE — 99214 OFFICE O/P EST MOD 30 MIN: CPT | Performed by: INTERNAL MEDICINE

## 2024-09-11 RX ORDER — METOPROLOL SUCCINATE 50 MG/1
50 TABLET, EXTENDED RELEASE ORAL DAILY
Qty: 90 TABLET | Refills: 0 | OUTPATIENT
Start: 2024-09-11

## 2024-09-11 RX ORDER — METOPROLOL SUCCINATE 50 MG/1
50 TABLET, EXTENDED RELEASE ORAL DAILY
Qty: 90 TABLET | Refills: 3 | Status: SHIPPED | OUTPATIENT
Start: 2024-09-11

## 2024-09-11 RX ORDER — METHOTREXATE 2.5 MG/1
12.5 TABLET ORAL WEEKLY
Qty: 60 TABLET | Refills: 0 | OUTPATIENT
Start: 2024-09-11

## 2024-09-11 NOTE — TELEPHONE ENCOUNTER
REFILL PASSED PER [facility] PROTOCOLS    Requested Prescriptions   Pending Prescriptions Disp Refills    metoprolol succinate ER 50 MG Oral Tablet 24 Hr 90 tablet 0     Sig: Take 1 tablet (50 mg total) by mouth daily.       Hypertension Medications Protocol Passed - 9/9/2024  5:37 PM        Passed - CMP or BMP in past 12 months        Passed - Last BP reading less than 140/90     BP Readings from Last 1 Encounters:   09/06/24 109/83               Passed - In person appointment or virtual visit in the past 12 mos or appointment in next 3 mos     Recent Outpatient Visits              3 weeks ago Compression fracture of T8 vertebra, initial encounter ([facility])    [facility]    Office Visit    3 months ago [facility]    Office Visit    3 months ago Seropositive rheumatoid arthritis ([facility]    Office Visit    10 months ago Medication monitoring encounter    [facility]    Office Visit    1 year ago Urinary incontinence, unspecified type    [facility] Alisson Farris [facility]    Office Visit          Future Appointments         Provider Department Appt Notes    [facility]    In 2 weeks PF MRI RM1 (1.5T) [facility]     In 3 weeks PF MRI RM1 (1.5T) [facility]                     Passed - EGFRCR or GFRNAA > 50     GFR Evaluation  EGFRCR: 78 , resulted on 9/6/2024               Future Appointments         Provider Department Appt Notes    Today Chapis Eller MD [facility] Recent hospital visit    In 2 weeks PF MRI RM1 (1.5T) [place]  [facility] MRI [facility]     In 3 weeks PF MRI RM1 (1.5T) [facility]           Recent Outpatient Visits              3 weeks ago Compression fracture of T8 vertebra, initial encounter ([facility])    [facility]    Office Visit    3 months ago [facility]    Office Visit    3 months ago Seropositive rheumatoid arthritis ([facility])    [facility]    Office Visit    10 months ago Medication monitoring encounter    [facility]    [facility] Visit    1 year ago Urinary incontinence, unspecified type    [facility]    Office Visit

## 2024-09-11 NOTE — PROGRESS NOTES
Subjective:     Patient ID: Kathia Grissom is a 84 year old female.    HPI    History/Other: Today for follow-up after she was discharged from the hospital.  She was admitted to hospital with weakness frequent falls and was diagnosed with UTI.  Prior to this admission she had multiple falls she had a compression fracture T7/T8.  They ordered MRI.  After initial fall with compression fracture she was discharged home with brace but she was not able to tolerate the brace that she was not using.  She has a appointment with a spine surgeon.  Her pain is uncontrolled.  Currently she is using a walker everywhere she was supposed to do a bone density scan few months ago but she never did  Review of Systems   Constitutional:  Positive for fatigue.   HENT: Negative.     Eyes: Negative.    Respiratory: Negative.     Cardiovascular: Negative.    Gastrointestinal: Negative.    Endocrine: Negative.    Genitourinary: Negative.    Musculoskeletal: Negative.    Neurological: Negative.    Hematological: Negative.    Psychiatric/Behavioral: Negative.       Current Outpatient Medications   Medication Sig Dispense Refill    metoprolol succinate ER 50 MG Oral Tablet 24 Hr Take 1 tablet (50 mg total) by mouth daily. 90 tablet 3    methotrexate 2.5 MG Oral Tab Take 5 tablets (12.5 mg total) by mouth once a week. 60 tablet 1    cephALEXin 500 MG Oral Cap Take 1 capsule (500 mg total) by mouth 3 (three) times daily for 5 days. 15 capsule 0    folic acid 1 MG Oral Tab Take 1 tablet (1 mg total) by mouth daily. 90 tablet 1    cholecalciferol 25 MCG (1000 UT) Oral Cap Take  by mouth.       Allergies:No Known Allergies    Past Medical History:    Arthritis    Rheumatoid arthritis (HCC)    Unspecified essential hypertension    Uterine prolapse      Past Surgical History:   Procedure Laterality Date    Hysterectomy  07/2016    Adam/Kaleigh 'complete w/bladder sling    Tubal ligation  40yrs ago      Family History   Problem Relation Age of Onset     Hypertension Father     Diabetes Father 40    Heart Disease Father 40    Diabetes Sister 57        Cause of death    Lung Disorder Brother         COPD      Social History:   Social History     Socioeconomic History    Marital status:    Tobacco Use    Smoking status: Never    Smokeless tobacco: Never    Tobacco comments:     None   Vaping Use    Vaping status: Never Used   Substance and Sexual Activity    Alcohol use: No    Drug use: No   Other Topics Concern    Caffeine Concern Yes     Comment: Coffee     Social Determinants of Health     Food Insecurity: No Food Insecurity (9/5/2024)    Food Insecurity     Food Insecurity: Never true   Transportation Needs: No Transportation Needs (9/5/2024)    Transportation Needs     Lack of Transportation: No   Housing Stability: Low Risk  (9/5/2024)    Housing Stability     Housing Instability: No        Objective:   Physical Exam  Vitals and nursing note reviewed.   Constitutional:       Appearance: Normal appearance.   HENT:      Head: Normocephalic and atraumatic.   Cardiovascular:      Rate and Rhythm: Normal rate and regular rhythm.      Pulses: Normal pulses.      Heart sounds: Normal heart sounds.   Pulmonary:      Effort: Pulmonary effort is normal.      Breath sounds: Normal breath sounds.   Abdominal:      General: Bowel sounds are normal.      Palpations: Abdomen is soft.   Musculoskeletal:         General: Normal range of motion.      Cervical back: Normal range of motion and neck supple.   Skin:     General: Skin is warm.   Neurological:      Mental Status: She is alert. Mental status is at baseline.   Psychiatric:         Mood and Affect: Mood normal.         Assessment & Plan:   1. Hypokalemia    We will recheck BMP  2.  Hyponatremia will check BMP  3 compression fracture T7 follow-up with the spine surgeon plan for MRI discussed with her that since she is following with rheumatology needs to discuss about starting treatment for osteoporosis    Orders  Placed This Encounter   Procedures    Basic Metabolic Panel (8) [E]       Meds This Visit:  Requested Prescriptions      No prescriptions requested or ordered in this encounter       Imaging & Referrals:  None

## 2024-09-16 ENCOUNTER — MED REC SCAN ONLY (OUTPATIENT)
Dept: INTERNAL MEDICINE CLINIC | Facility: CLINIC | Age: 84
End: 2024-09-16

## 2024-09-17 ENCOUNTER — HOSPITAL ENCOUNTER (EMERGENCY)
Age: 84
Discharge: HOME | End: 2024-09-17
Attending: EMERGENCY MEDICINE
Payer: MEDICARE

## 2024-09-17 ENCOUNTER — APPOINTMENT (OUTPATIENT)
Dept: GENERAL RADIOLOGY | Age: 84
End: 2024-09-17
Attending: EMERGENCY MEDICINE
Payer: MEDICARE

## 2024-09-17 VITALS
OXYGEN SATURATION: 97 % | BODY MASS INDEX: 20 KG/M2 | DIASTOLIC BLOOD PRESSURE: 74 MMHG | WEIGHT: 98 LBS | SYSTOLIC BLOOD PRESSURE: 155 MMHG | TEMPERATURE: 99 F | HEART RATE: 81 BPM | RESPIRATION RATE: 18 BRPM

## 2024-09-17 DIAGNOSIS — R41.0 CONFUSION: ICD-10-CM

## 2024-09-17 DIAGNOSIS — E87.1 HYPONATREMIA: ICD-10-CM

## 2024-09-17 DIAGNOSIS — N39.0 URINARY TRACT INFECTION WITHOUT HEMATURIA, SITE UNSPECIFIED: Primary | ICD-10-CM

## 2024-09-17 LAB
ALBUMIN SERPL-MCNC: 3 G/DL (ref 3.4–5)
ALBUMIN/GLOB SERPL: 0.8 {RATIO} (ref 1–2)
ALP LIVER SERPL-CCNC: 79 U/L
ALT SERPL-CCNC: 22 U/L
ANION GAP SERPL CALC-SCNC: 6 MMOL/L (ref 0–18)
AST SERPL-CCNC: 35 U/L (ref 15–37)
BASOPHILS # BLD AUTO: 0.03 X10(3) UL (ref 0–0.2)
BASOPHILS NFR BLD AUTO: 0.9 %
BILIRUB SERPL-MCNC: 0.6 MG/DL (ref 0.1–2)
BILIRUB UR QL STRIP.AUTO: NEGATIVE
BUN BLD-MCNC: 14 MG/DL (ref 9–23)
CALCIUM BLD-MCNC: 8.9 MG/DL (ref 8.5–10.1)
CHLORIDE SERPL-SCNC: 97 MMOL/L (ref 98–112)
CO2 SERPL-SCNC: 25 MMOL/L (ref 21–32)
CREAT BLD-MCNC: 0.95 MG/DL
EGFRCR SERPLBLD CKD-EPI 2021: 60 ML/MIN/1.73M2 (ref 60–?)
EOSINOPHIL # BLD AUTO: 0.05 X10(3) UL (ref 0–0.7)
EOSINOPHIL NFR BLD AUTO: 1.4 %
ERYTHROCYTE [DISTWIDTH] IN BLOOD BY AUTOMATED COUNT: 15.9 %
GLOBULIN PLAS-MCNC: 3.9 G/DL (ref 2.8–4.4)
GLUCOSE BLD-MCNC: 91 MG/DL (ref 70–99)
GLUCOSE UR STRIP.AUTO-MCNC: NEGATIVE MG/DL
HCT VFR BLD AUTO: 32.2 %
HGB BLD-MCNC: 10.8 G/DL
IMM GRANULOCYTES # BLD AUTO: 0.02 X10(3) UL (ref 0–1)
IMM GRANULOCYTES NFR BLD: 0.6 %
KETONES UR STRIP.AUTO-MCNC: NEGATIVE MG/DL
LYMPHOCYTES # BLD AUTO: 0.65 X10(3) UL (ref 1–4)
LYMPHOCYTES NFR BLD AUTO: 18.6 %
MCH RBC QN AUTO: 31.5 PG (ref 26–34)
MCHC RBC AUTO-ENTMCNC: 33.5 G/DL (ref 31–37)
MCV RBC AUTO: 93.9 FL
MONOCYTES # BLD AUTO: 0.24 X10(3) UL (ref 0.1–1)
MONOCYTES NFR BLD AUTO: 6.9 %
NEUTROPHILS # BLD AUTO: 2.51 X10 (3) UL (ref 1.5–7.7)
NEUTROPHILS # BLD AUTO: 2.51 X10(3) UL (ref 1.5–7.7)
NEUTROPHILS NFR BLD AUTO: 71.6 %
NITRITE UR QL STRIP.AUTO: NEGATIVE
OSMOLALITY SERPL CALC.SUM OF ELEC: 266 MOSM/KG (ref 275–295)
PH UR STRIP.AUTO: 7 [PH] (ref 5–8)
PLATELET # BLD AUTO: 360 10(3)UL (ref 150–450)
POTASSIUM SERPL-SCNC: 4.2 MMOL/L (ref 3.5–5.1)
PROT SERPL-MCNC: 6.9 G/DL (ref 6.4–8.2)
RBC # BLD AUTO: 3.43 X10(6)UL
SODIUM SERPL-SCNC: 128 MMOL/L (ref 136–145)
SP GR UR STRIP.AUTO: 1.02 (ref 1–1.03)
UROBILINOGEN UR STRIP.AUTO-MCNC: 0.2 MG/DL
WBC # BLD AUTO: 3.5 X10(3) UL (ref 4–11)
WBC #/AREA URNS AUTO: >50 /HPF

## 2024-09-17 PROCEDURE — 81001 URINALYSIS AUTO W/SCOPE: CPT | Performed by: EMERGENCY MEDICINE

## 2024-09-17 PROCEDURE — 93005 ELECTROCARDIOGRAM TRACING: CPT

## 2024-09-17 PROCEDURE — 87086 URINE CULTURE/COLONY COUNT: CPT | Performed by: EMERGENCY MEDICINE

## 2024-09-17 PROCEDURE — 99284 EMERGENCY DEPT VISIT MOD MDM: CPT

## 2024-09-17 PROCEDURE — 99285 EMERGENCY DEPT VISIT HI MDM: CPT

## 2024-09-17 PROCEDURE — 80053 COMPREHEN METABOLIC PANEL: CPT | Performed by: EMERGENCY MEDICINE

## 2024-09-17 PROCEDURE — 85025 COMPLETE CBC W/AUTO DIFF WBC: CPT | Performed by: EMERGENCY MEDICINE

## 2024-09-17 PROCEDURE — 93010 ELECTROCARDIOGRAM REPORT: CPT

## 2024-09-17 PROCEDURE — 81015 MICROSCOPIC EXAM OF URINE: CPT | Performed by: EMERGENCY MEDICINE

## 2024-09-17 PROCEDURE — 71045 X-RAY EXAM CHEST 1 VIEW: CPT | Performed by: EMERGENCY MEDICINE

## 2024-09-17 PROCEDURE — 96365 THER/PROPH/DIAG IV INF INIT: CPT

## 2024-09-17 RX ORDER — CEPHALEXIN 500 MG/1
500 CAPSULE ORAL 4 TIMES DAILY
Qty: 40 CAPSULE | Refills: 0 | Status: SHIPPED | OUTPATIENT
Start: 2024-09-17 | End: 2024-09-27

## 2024-09-18 LAB
ATRIAL RATE: 78 BPM
P AXIS: 11 DEGREES
P-R INTERVAL: 160 MS
Q-T INTERVAL: 364 MS
QRS DURATION: 76 MS
QTC CALCULATION (BEZET): 414 MS
R AXIS: 5 DEGREES
T AXIS: 0 DEGREES
VENTRICULAR RATE: 78 BPM

## 2024-09-18 RX ORDER — METOPROLOL SUCCINATE 50 MG/1
50 TABLET, EXTENDED RELEASE ORAL DAILY
Qty: 90 TABLET | Refills: 3 | OUTPATIENT
Start: 2024-09-18

## 2024-09-18 NOTE — ED INITIAL ASSESSMENT (HPI)
Family  pt was here a few weeks ago and admitted for uti and dehydration. Family states went home after 1 day, finished all her meds and was doing well. States today pt began to complain of dry throat and has bee confused. Pt has had diarrhea sin Saturday and not drinking well.

## 2024-09-23 NOTE — PROGRESS NOTES
Patient went in for hospital follow-up appointment with primary care provider on 9/11/24.  Encounter closing.

## 2024-09-24 ENCOUNTER — TELEPHONE (OUTPATIENT)
Dept: INTERNAL MEDICINE CLINIC | Facility: CLINIC | Age: 84
End: 2024-09-24

## 2024-09-24 NOTE — TELEPHONE ENCOUNTER
Physical therapist calling, stated pt has right side pain around the area of post shingles , pain comes in waves 10/10, using icy hot pain patch OTC, stated patient was grimacing , moderate pain, stated patient normally participate with therapy but today , too much pain    Asking for further advise for pain, RX strength  for patient       Please sherie Pysical therapist and  Pt's daughter with 's Reply

## 2024-09-24 NOTE — TELEPHONE ENCOUNTER
Notified Lyubov that lidocaine patches were sent to pharmacy.  Dr Eller wants to avoid narcotics due to fall risk.  Left message on daughter Allyssa's voicemail that Dr Eller sent in new script.    Lyubov will text patient's daughter to notify.

## 2024-10-01 ENCOUNTER — HOSPITAL ENCOUNTER (OUTPATIENT)
Dept: MRI IMAGING | Age: 84
Discharge: HOME | End: 2024-10-01
Payer: MEDICARE

## 2024-10-01 DIAGNOSIS — M80.00XA AGE-RELATED OSTEOPOROSIS WITH CURRENT PATHOLOGICAL FRACTURE, INITIAL ENCOUNTER: ICD-10-CM

## 2024-10-01 DIAGNOSIS — S22.060A COMPRESSION FRACTURE OF T7 VERTEBRA, INITIAL ENCOUNTER (HCC): ICD-10-CM

## 2024-10-01 DIAGNOSIS — S22.060A COMPRESSION FRACTURE OF T8 VERTEBRA, INITIAL ENCOUNTER (HCC): ICD-10-CM

## 2024-10-01 DIAGNOSIS — S22.080A COMPRESSION FRACTURE OF T12 VERTEBRA, INITIAL ENCOUNTER (HCC): ICD-10-CM

## 2024-10-01 DIAGNOSIS — M54.6 ACUTE MIDLINE THORACIC BACK PAIN: ICD-10-CM

## 2024-10-01 PROCEDURE — 72146 MRI CHEST SPINE W/O DYE: CPT

## 2024-10-02 ENCOUNTER — HOSPITAL ENCOUNTER (OUTPATIENT)
Dept: MRI IMAGING | Age: 84
Discharge: HOME | End: 2024-10-02
Payer: MEDICARE

## 2024-10-02 DIAGNOSIS — S32.010A COMPRESSION FRACTURE OF L1 VERTEBRA, INITIAL ENCOUNTER (HCC): ICD-10-CM

## 2024-10-02 DIAGNOSIS — M80.00XA AGE-RELATED OSTEOPOROSIS WITH CURRENT PATHOLOGICAL FRACTURE, INITIAL ENCOUNTER: ICD-10-CM

## 2024-10-02 PROCEDURE — 72148 MRI LUMBAR SPINE W/O DYE: CPT

## 2024-10-08 ENCOUNTER — MED REC SCAN ONLY (OUTPATIENT)
Dept: INTERNAL MEDICINE CLINIC | Facility: CLINIC | Age: 84
End: 2024-10-08

## 2024-10-23 ENCOUNTER — OFFICE VISIT (OUTPATIENT)
Dept: INTERNAL MEDICINE CLINIC | Facility: CLINIC | Age: 84
End: 2024-10-23

## 2024-10-23 VITALS
HEIGHT: 58 IN | SYSTOLIC BLOOD PRESSURE: 130 MMHG | TEMPERATURE: 98 F | BODY MASS INDEX: 20.36 KG/M2 | WEIGHT: 97 LBS | DIASTOLIC BLOOD PRESSURE: 75 MMHG | HEART RATE: 76 BPM | OXYGEN SATURATION: 97 %

## 2024-10-23 DIAGNOSIS — E87.1 HYPONATREMIA: Primary | ICD-10-CM

## 2024-10-23 DIAGNOSIS — R31.9 HEMATURIA, UNSPECIFIED TYPE: ICD-10-CM

## 2024-10-23 DIAGNOSIS — N30.00 ACUTE CYSTITIS WITHOUT HEMATURIA: ICD-10-CM

## 2024-10-23 LAB
BILIRUBIN: NEGATIVE
GLUCOSE (URINE DIPSTICK): NEGATIVE MG/DL
KETONES (URINE DIPSTICK): NEGATIVE MG/DL
MULTISTIX LOT#: ABNORMAL NUMERIC
NITRITE, URINE: NEGATIVE
PH, URINE: 6 (ref 4.5–8)
PROTEIN (URINE DIPSTICK): 30 MG/DL
SODIUM SERPL-SCNC: 91 MMOL/L
SPECIFIC GRAVITY: 1.02 (ref 1–1.03)
URINE-COLOR: YELLOW
UROBILINOGEN,SEMI-QN: 0.2 MG/DL (ref 0–1.9)

## 2024-10-23 PROCEDURE — G0008 ADMIN INFLUENZA VIRUS VAC: HCPCS | Performed by: INTERNAL MEDICINE

## 2024-10-23 PROCEDURE — 90656 IIV3 VACC NO PRSV 0.5 ML IM: CPT | Performed by: INTERNAL MEDICINE

## 2024-10-23 PROCEDURE — 81003 URINALYSIS AUTO W/O SCOPE: CPT | Performed by: INTERNAL MEDICINE

## 2024-10-23 PROCEDURE — 99214 OFFICE O/P EST MOD 30 MIN: CPT | Performed by: INTERNAL MEDICINE

## 2024-10-23 NOTE — PROGRESS NOTES
Subjective:     Patient ID: Kathia Grissom is a 84 year old female.    HPI    History/Other:   She came in today for follow-up.  According to the patient she was diagnosed twice with a UTI last UTI was 1 month ago she is here today to check to make sure it is resolved currently she denies any symptoms no burning during urination no pain during her urination.  On her last visit to ER they told her that her sodium is also low.  At that time she was also confused.  Currently she is at her baseline.  Review of Systems   Constitutional: Negative.    HENT: Negative.     Eyes: Negative.    Respiratory: Negative.     Cardiovascular: Negative.    Gastrointestinal: Negative.    Endocrine: Negative.    Genitourinary: Negative.    Musculoskeletal: Negative.    Neurological: Negative.    Hematological: Negative.    Psychiatric/Behavioral: Negative.       Current Outpatient Medications   Medication Sig Dispense Refill    metoprolol succinate ER 50 MG Oral Tablet 24 Hr Take 1 tablet (50 mg total) by mouth daily. 90 tablet 3    methotrexate 2.5 MG Oral Tab Take 5 tablets (12.5 mg total) by mouth once a week. 60 tablet 1    folic acid 1 MG Oral Tab Take 1 tablet (1 mg total) by mouth daily. 90 tablet 1    cholecalciferol 25 MCG (1000 UT) Oral Cap Take  by mouth.       Allergies:Allergies[1]    Past Medical History:    Arthritis    Rheumatoid arthritis (HCC)    Unspecified essential hypertension    Uterine prolapse      Past Surgical History:   Procedure Laterality Date    Hysterectomy  07/2016    Adam/Kaleigh 'alina w/bladder sling    Tubal ligation  40yrs ago      Family History   Problem Relation Age of Onset    Hypertension Father     Diabetes Father 40    Heart Disease Father 40    Diabetes Sister 57        Cause of death    Lung Disorder Brother         COPD      Social History:   Social History     Socioeconomic History    Marital status:    Tobacco Use    Smoking status: Never    Smokeless tobacco: Never    Tobacco  comments:     None   Vaping Use    Vaping status: Never Used   Substance and Sexual Activity    Alcohol use: No    Drug use: No   Other Topics Concern    Caffeine Concern Yes     Comment: Coffee     Social Drivers of Health     Food Insecurity: No Food Insecurity (9/5/2024)    Food Insecurity     Food Insecurity: Never true   Transportation Needs: No Transportation Needs (9/5/2024)    Transportation Needs     Lack of Transportation: No   Housing Stability: Low Risk  (9/5/2024)    Housing Stability     Housing Instability: No        Objective:   Physical Exam  Vitals and nursing note reviewed.   Constitutional:       Appearance: Normal appearance.   HENT:      Head: Normocephalic and atraumatic.   Cardiovascular:      Rate and Rhythm: Normal rate and regular rhythm.      Pulses: Normal pulses.      Heart sounds: Normal heart sounds.   Pulmonary:      Effort: Pulmonary effort is normal.      Breath sounds: Normal breath sounds.   Abdominal:      Palpations: Abdomen is soft.   Musculoskeletal:         General: Normal range of motion.      Cervical back: Normal range of motion and neck supple.   Skin:     General: Skin is warm.   Neurological:      Mental Status: She is alert. Mental status is at baseline.   Psychiatric:         Mood and Affect: Mood normal.         Assessment & Plan:   1. Hyponatremia -I will check urine sodium urine and serum osmolarity   2. Acute cystitis without hematuria UA noted I will send urine for culture currently she is asymptomatic   3. Hematuria, unspecified type /will send urine cytology       Orders Placed This Encounter   Procedures    Osmolality, Serum [E]    Osmolality, Urine [E]    Basic Metabolic Panel (8) [E]    Sodium, Urine, Random [E]    URINALYSIS, AUTO, W/O SCOPE    INFLUENZA VACCINE, TRI, PRESERV FREE, 0.5 ML    Cytology, fluids    Urine Culture, Routine [E]       Meds This Visit:  Requested Prescriptions      No prescriptions requested or ordered in this encounter        Imaging & Referrals:  INFLUENZA VACCINE, TRI, PRESERV FREE, 0.5 ML            [1] No Known Allergies

## 2024-10-24 LAB — NON GYNE INTERPRETATION: NEGATIVE

## 2024-11-05 ENCOUNTER — MED REC SCAN ONLY (OUTPATIENT)
Dept: INTERNAL MEDICINE CLINIC | Facility: CLINIC | Age: 84
End: 2024-11-05

## 2024-12-12 ENCOUNTER — TELEPHONE (OUTPATIENT)
Dept: RHEUMATOLOGY | Facility: CLINIC | Age: 84
End: 2024-12-12

## 2024-12-12 NOTE — TELEPHONE ENCOUNTER
Current Outpatient Medications   Medication Sig Dispense Refill    methotrexate 2.5 MG Oral Tab Take 5 tablets (12.5 mg total) by mouth once a week. 60 tablet 1

## 2024-12-13 NOTE — TELEPHONE ENCOUNTER
Called and spoke with pharmacist Emerita, inform patient contact our office requesting a new refill for methotrexate but there is still 1 remaining. Pharmacist stated for some reason this rx close but will process a new one. No need to sent another refill.    Outpatient Medication Detail     Disp Refills Start End    methotrexate 2.5 MG Oral Tab 60 tablet 1 9/10/2024 --    Sig - Route: Take 5 tablets (12.5 mg total) by mouth once a week. - Oral    Sent to pharmacy as: Methotrexate Sodium 2.5 MG Oral Tablet (Rheumatrex)    E-Prescribing Status: Receipt confirmed by pharmacy (9/10/2024  1:31 PM CDT)      Pharmacy    [id number] [address] [place] [address] [facility] [address] [phone], [phone]

## 2025-01-02 ENCOUNTER — TELEPHONE (OUTPATIENT)
Dept: INTERNAL MEDICINE CLINIC | Facility: CLINIC | Age: 85
End: 2025-01-02

## 2025-01-02 NOTE — TELEPHONE ENCOUNTER
[facility] will be [facility] over death certificate for Dr Chapis Eller to sign today.

## 2025-01-03 NOTE — TELEPHONE ENCOUNTER
Everardo [facility]  home   [phone]  Is on the phone; sounds angry that he has been trying he said for 3 days to get a signed death certificate,      [address] [place] [address] gave me the Mk fax # [phone], so expect a fax from [facility]/ home to this fax for Dr Nation to sign.

## (undated) DIAGNOSIS — M05.9 SEROPOSITIVE RHEUMATOID ARTHRITIS (HCC): ICD-10-CM

## (undated) DIAGNOSIS — Z51.81 MEDICATION MONITORING ENCOUNTER: Primary | ICD-10-CM

## (undated) NOTE — MR AVS SNAPSHOT
[address] [facility] [address]  [phone]               Thank you for choosing us for your health care visit with Veena Cabrera MD.  We are glad to serve you and happy to provide you with this summary of your folic acid 1 MG Tabs   Take  by mouth. Commonly known as:  FOLVITE           hydrochlorothiazide 12.5 MG Caps   Take 1 capsule (12.5 mg total) by mouth once daily. What changed:  See the new instructions.    Commonly known as:  An Tian

## (undated) NOTE — LETTER
7/25/2019              [address], [address]        [facility] [address]         Dear Eleanor Rowland,      It was a pleasure to see you at our [address] [place] [address] office for your Rheumatoid Arthritis. 3.5 - 5.1 mmol/L 3.7   Chloride      98 - 112 mmol/L 101   Carbon Dioxide, Total      21.0 - 32.0 mmol/L 30.0   ANION GAP      0 - 18 mmol/L 7   BUN      7 - 18 mg/dL 14   CREATININE      0.55 - 1.02 mg/dL 0.86   BUN/CREAT Ratio      10.0 - 20.0 16.3